# Patient Record
Sex: FEMALE | Race: BLACK OR AFRICAN AMERICAN | NOT HISPANIC OR LATINO | Employment: UNEMPLOYED | URBAN - METROPOLITAN AREA
[De-identification: names, ages, dates, MRNs, and addresses within clinical notes are randomized per-mention and may not be internally consistent; named-entity substitution may affect disease eponyms.]

---

## 2017-04-04 ENCOUNTER — HOSPITAL ENCOUNTER (EMERGENCY)
Facility: HOSPITAL | Age: 32
Discharge: HOME | End: 2017-04-04
Attending: EMERGENCY MEDICINE
Payer: MEDICAID

## 2017-04-04 ENCOUNTER — HOSPITAL ENCOUNTER (OUTPATIENT)
Dept: RADIOLOGY | Facility: HOSPITAL | Age: 32
Discharge: HOME | End: 2017-04-04
Attending: EMERGENCY MEDICINE
Payer: MEDICAID

## 2017-04-04 VITALS
HEIGHT: 67 IN | TEMPERATURE: 99 F | RESPIRATION RATE: 20 BRPM | OXYGEN SATURATION: 100 % | BODY MASS INDEX: 37.67 KG/M2 | HEART RATE: 75 BPM | DIASTOLIC BLOOD PRESSURE: 86 MMHG | SYSTOLIC BLOOD PRESSURE: 171 MMHG | WEIGHT: 240 LBS

## 2017-04-04 DIAGNOSIS — N63.0 BREAST MASS: Primary | ICD-10-CM

## 2017-04-04 DIAGNOSIS — N63.0 BREAST MASS: ICD-10-CM

## 2017-04-04 PROCEDURE — 99283 EMERGENCY DEPT VISIT LOW MDM: CPT

## 2017-04-04 NOTE — ED NOTES
Assisted MD with breast exam. Hardened/raised areas noted to bilateral breast. Order for mammogram given per MD

## 2017-04-04 NOTE — ED PROVIDER NOTES
"Encounter Date: 2017       History     Chief Complaint   Patient presents with    Breast Mass     "i went to my primary about 3 weeks ago and he told me to follow up but i didnt yet and the mass is back and i need to get it checked out"      Review of patient's allergies indicates:  No Known Allergies  Patient is a 31 y.o. female presenting with the following complaint: general illness. The history is provided by the patient.   General Illness    The current episode started several weeks ago. The problem occurs continuously. The problem has been unchanged. Nothing relieves the symptoms. Nothing aggravates the symptoms. Pertinent negatives include no fever, no eye itching, no photophobia, no abdominal pain, no constipation, no diarrhea, no nausea, no vomiting, no congestion, no ear pain, no headaches, no rhinorrhea, no sore throat, no swollen glands, no muscle aches, no neck pain, no neck stiffness, no cough, no shortness of breath, no URI, no wheezing, no rash, no diaper rash, no discharge and no pain. She has received no recent medical care.     History reviewed. No pertinent past medical history.  Past Surgical History:   Procedure Laterality Date     SECTION      TUBAL LIGATION       History reviewed. No pertinent family history.  Social History   Substance Use Topics    Smoking status: Never Smoker    Smokeless tobacco: None    Alcohol use No     Review of Systems   Constitutional: Negative for chills, diaphoresis and fever.   HENT: Negative.  Negative for congestion, drooling, ear pain, rhinorrhea and sore throat.    Eyes: Negative.  Negative for photophobia, pain, discharge and itching.   Respiratory: Negative.  Negative for cough, shortness of breath and wheezing.    Cardiovascular: Negative for chest pain, palpitations and leg swelling.   Gastrointestinal: Negative for abdominal pain, constipation, diarrhea, nausea and vomiting.   Genitourinary: Negative for difficulty urinating and " dysuria.   Musculoskeletal: Negative for arthralgias, back pain, gait problem, neck pain and neck stiffness.   Skin: Negative for color change, pallor and rash.   Neurological: Negative for dizziness, seizures, weakness, numbness and headaches.   Psychiatric/Behavioral: Negative for confusion and decreased concentration.   All other systems reviewed and are negative.      Physical Exam   Initial Vitals   BP Pulse Resp Temp SpO2   04/04/17 1340 04/04/17 1340 04/04/17 1340 04/04/17 1340 04/04/17 1340   171/86 75 20 98.8 °F (37.1 °C) 100 %     Physical Exam    Constitutional: She appears well-developed and well-nourished. No distress.   HENT:   Head: Normocephalic and atraumatic.   Eyes: Conjunctivae are normal. Pupils are equal, round, and reactive to light.   Neck: Normal range of motion. Neck supple.   Cardiovascular: Normal rate, regular rhythm and normal heart sounds.   Pulmonary/Chest: Breath sounds normal.       Abdominal: Soft. Bowel sounds are normal. She exhibits no distension. There is no tenderness. There is no rebound.   Musculoskeletal: Normal range of motion. She exhibits no edema.   Neurological: She is alert and oriented to person, place, and time. She has normal strength.   Skin: Skin is warm and dry.   Psychiatric: She has a normal mood and affect.         ED Course   Procedures  Labs Reviewed - No data to display     Patient given outpatient order for mammogram, and instructions to follow up with PCP.                       ED Course     Clinical Impression:       ICD-10-CM ICD-9-CM   1. Breast mass N63 611.72         Disposition:   Disposition: Discharged  Condition: Stable       Ezra Austin MD  04/04/17 1716

## 2017-04-04 NOTE — ED NOTES
Pt stable, in NAD, and states no further needs at this time. MD aware of vitals ok to d/c.Pt to be d/c'd home.

## 2017-04-04 NOTE — DISCHARGE INSTRUCTIONS
Breast Lump, Uncertain Cause    A lump was found in your breast. Most breast lumps are not cancer. They may be caused by normal changes in the breast tissue due to hormone variations that occur with your menstrual cycle. Some women may form lumps that are painful and tender. Others may form lumps that are painless.  At this time, is not possible to be certain of the cause of your lump without further evaluation. This could include:  · Another exam by your healthcare provider or a gynecologist  · Imaging tests, such as a mammogram or ultrasound  · Biopsy (procedure to remove small tissue samples from the breast lump)  Your healthcare provider will explain any additional testing that is needed. Be sure to get answers to any questions you may have.  Home care  Until a diagnosis is made, you may be advised to do the following:  · If you are having breast pain:  ¨ Take an over-the-counter pain reliever, if directed to by your provider.  ¨ Wear a well-fitted bra or sports bra for extra support. If you have breast pain at night, try wearing the bra during sleep.  ¨ Apply a warm compress (towel soaked in warm water) to the breast. You may also use a hot water bottle.  · Check your breasts each day. Keep a log of whether the lump seems to be changing in size or tenderness with your period. This can help your healthcare provider make the correct diagnosis.  Follow-up care  Follow up with your healthcare provider as directed. Keep all appointments. Also, prepare for any upcoming tests as directed.  When to seek medical advice  Call your healthcare provider right away if any of these occur:  · Fever of 100.4°F (38°C) or higher  · Redness or swelling of the breast  · Discharge from the nipple  · Visible changes in the skin over the nipple or breast  · Lump grows larger, feels very hard, or has an irregular shape  · New lumps form  Date Last Reviewed: 4/26/2015  © 0519-1255 [employer]. [address] [place] [address],  [place] [address]. All rights reserved. This information is not intended as a substitute for professional medical care. Always follow your healthcare professional's instructions.

## 2017-04-04 NOTE — ED AVS SNAPSHOT
"          [facility]  [address]  [facility] [address]               Ashley Powers   2017  1:41 PM   ED    Description:  Female : 1985   Department:  [facility]           Your Care was Coordinated By:     Provider Role From To    Ezra Austin MD Attending Provider 17 1341 --      Reason for Visit     Breast Mass           Diagnoses this Visit        Comments    Breast mass    -  Primary       ED Disposition     ED Disposition Condition Comment    Discharge             To Do List           Follow-up Information     Follow up with [facility].    Contact information:    [address] [place] [address] [address]  [place] [address]  [phone]        [facility]     [facility] -  Assistance  Unless otherwise directed by your provider, please contact [facility], our nurse care line that is available for  assistance.     Registered nurses in the [facility] provide: appointment scheduling, clinical advisement, health education, and other advisory services.  Call: [phone] (toll free)               Medications           Message regarding Medications     Verify the changes and/or additions to your medication regime listed below are the same as discussed with your clinician today.  If any of these changes or additions are incorrect, please notify your healthcare provider.             Verify that the below list of medications is an accurate representation of the medications you are currently taking.  If none reported, the list may be blank. If incorrect, please contact your healthcare provider. Carry this list with you in case of emergency.                Clinical Reference Information           Your Vitals Were     BP Pulse Temp Resp Height Weight    171/86 (BP Location: Left arm, Patient Position: Sitting) 75 98.8 °F (37.1 °C) (Oral) 20 5' 7" (1.702 m) 108.9 kg (240 lb)    Last Period SpO2 BMI          " 03/17/2017 (Approximate) 100% 37.59 kg/m2        Allergies as of 4/4/2017     No Known Allergies      Immunizations Administered on Date of Encounter - 4/4/2017     None      ED Micro, Lab, POCT     None      ED Imaging Orders     None        Discharge Instructions         Breast Lump, Uncertain Cause    A lump was found in your breast. Most breast lumps are not cancer. They may be caused by normal changes in the breast tissue due to hormone variations that occur with your menstrual cycle. Some women may form lumps that are painful and tender. Others may form lumps that are painless.  At this time, is not possible to be certain of the cause of your lump without further evaluation. This could include:  · Another exam by your healthcare provider or a gynecologist  · Imaging tests, such as a mammogram or ultrasound  · Biopsy (procedure to remove small tissue samples from the breast lump)  Your healthcare provider will explain any additional testing that is needed. Be sure to get answers to any questions you may have.  Home care  Until a diagnosis is made, you may be advised to do the following:  · If you are having breast pain:  ¨ Take an over-the-counter pain reliever, if directed to by your provider.  ¨ Wear a well-fitted bra or sports bra for extra support. If you have breast pain at night, try wearing the bra during sleep.  ¨ Apply a warm compress (towel soaked in warm water) to the breast. You may also use a hot water bottle.  · Check your breasts each day. Keep a log of whether the lump seems to be changing in size or tenderness with your period. This can help your healthcare provider make the correct diagnosis.  Follow-up care  Follow up with your healthcare provider as directed. Keep all appointments. Also, prepare for any upcoming tests as directed.  When to seek medical advice  Call your healthcare provider right away if any of these occur:  · Fever of 100.4°F (38°C) or higher  · Redness or swelling of the  breast  · Discharge from the nipple  · Visible changes in the skin over the nipple or breast  · Lump grows larger, feels very hard, or has an irregular shape  · New lumps form  Date Last Reviewed: 4/26/2015  © 4094-0224 [employer]. [address] [place] [address]. All rights reserved. This information is not intended as a substitute for professional medical care. Always follow your healthcare professional's instructions.          [facility] Sign-Up     Activating your [facility] account is as easy as 1-2-3!     1) Visit [email or web address], select Sign Up Now, enter this activation code and your date of birth, then select Next.  [email or web address]  Expires: 5/19/2017  1:59 PM      2) Create a username and password to use when you visit [facility] in the future and select a security question in case you lose your password and select Next.    3) Enter your e-mail address and click Sign Up!    Additional Information  If you have questions, please e-mail [email or web address] or call [phone] to talk to our [facility] staff. Remember, [facility] is NOT to be used for urgent needs. For medical emergencies, dial 911.          [facility] complies with applicable Federal civil rights laws and does not discriminate on the basis of race, color, national origin, age, disability, or sex.        Language Assistance Services     ATTENTION: Language assistance services are available, free of charge. Please call [phone].      ATENCIÓN: Si habla español, tiene a de la garza disposición servicios gratuitos de asistencia lingüística. Llame al [phone].     CHÚ Ý: N?u b?n nói Ti?ng Vi?t, có các d?ch v? h? tr? ngôn ng? mi?n phí dành cho b?n. G?i s? [phone].

## 2017-04-10 ENCOUNTER — TELEPHONE (OUTPATIENT)
Dept: RADIOLOGY | Facility: HOSPITAL | Age: 32
End: 2017-04-10

## 2017-06-07 ENCOUNTER — HOSPITAL ENCOUNTER (EMERGENCY)
Facility: HOSPITAL | Age: 32
Discharge: HOME | End: 2017-06-07
Attending: EMERGENCY MEDICINE
Payer: MEDICAID

## 2017-06-07 VITALS
RESPIRATION RATE: 20 BRPM | TEMPERATURE: 98 F | OXYGEN SATURATION: 98 % | WEIGHT: 258 LBS | DIASTOLIC BLOOD PRESSURE: 93 MMHG | HEART RATE: 81 BPM | BODY MASS INDEX: 40.41 KG/M2 | SYSTOLIC BLOOD PRESSURE: 167 MMHG

## 2017-06-07 DIAGNOSIS — K05.30 PERIODONTITIS: Primary | ICD-10-CM

## 2017-06-07 DIAGNOSIS — K02.9 DENTAL CARIES: ICD-10-CM

## 2017-06-07 PROCEDURE — 99283 EMERGENCY DEPT VISIT LOW MDM: CPT

## 2017-06-07 RX ORDER — PENICILLIN V POTASSIUM 500 MG/1
500 TABLET, FILM COATED ORAL 4 TIMES DAILY
Qty: 40 TABLET | Refills: 0 | Status: SHIPPED | OUTPATIENT
Start: 2017-06-07 | End: 2017-06-14

## 2017-06-07 RX ORDER — TRAMADOL HYDROCHLORIDE 50 MG/1
50 TABLET ORAL EVERY 6 HOURS PRN
Qty: 12 TABLET | Refills: 0 | Status: SHIPPED | OUTPATIENT
Start: 2017-06-07 | End: 2017-06-17

## 2017-06-07 NOTE — DISCHARGE INSTRUCTIONS
Advised patient to make an appointment with dentist for examination and treatment of their dental pain, as well as routine cleaning and disease prevention.  For prevention, patient was encouraged to: perform oral hygiene daily; have regular professional cleaning; brush teeth at least twice a day; floss daily; avoid constant sipping of sugary drinks or frequent sucking on candy and mints; and use toothpaste containing fluoride. Encouraged patient to gargle with warm salt water several times a day, continue to floss after eating, and complete full course of antibiotics.

## 2017-06-07 NOTE — ED PROVIDER NOTES
Encounter Date: 2017       History     Chief Complaint   Patient presents with    Dental Pain     x 1 day; took 800 mg ibuprofen with minimal relief; denies fever     Review of patient's allergies indicates:  No Known Allergies  The history is provided by the patient.   Dental Pain   The primary symptoms include mouth pain. Primary symptoms do not include dental injury, oral bleeding, oral lesions, headaches, fever, shortness of breath, sore throat or angioedema. The symptoms began several days ago. The symptoms are waxing and waning. The symptoms are recurrent. The symptoms occur constantly.   Mouth pain began 3 - 5 days ago. Mouth pain occurs constantly. Mouth pain is worsening. Affected locations include: teeth and gum(s). Pain scale at highest for mouth pain: mild. Pain scale currently for mouth pain: mild.   Additional symptoms include: dental sensitivity to temperature, gum swelling and gum tenderness. Additional symptoms do not include: purulent gums, trismus, jaw pain, facial swelling, trouble swallowing, pain with swallowing, excessive salivation, dry mouth, taste disturbance, smell disturbance, drooling, ear pain, hearing loss, nosebleeds, swollen glands, goiter and fatigue. Medical issues do not include: alcohol problem, smoking, chewing tobacco, immunosuppression, periodontal disease and cancer.     No past medical history on file.  Past Surgical History:   Procedure Laterality Date     SECTION      TUBAL LIGATION       No family history on file.  Social History   Substance Use Topics    Smoking status: Never Smoker    Smokeless tobacco: Not on file    Alcohol use No     Review of Systems   Constitutional: Negative for fatigue and fever.   HENT: Negative for drooling, ear pain, facial swelling, hearing loss, nosebleeds, sore throat and trouble swallowing.    Respiratory: Negative for shortness of breath.    Cardiovascular: Negative for chest pain.   Gastrointestinal: Negative for  nausea.   Genitourinary: Negative for dysuria.   Musculoskeletal: Negative for back pain.   Skin: Negative for rash.   Neurological: Negative for weakness and headaches.   Hematological: Does not bruise/bleed easily.   All other systems reviewed and are negative.      Physical Exam     Initial Vitals [06/07/17 0054]   BP Pulse Resp Temp SpO2   (!) 167/93 81 20 98.3 °F (36.8 °C) 98 %     Physical Exam    Nursing note and vitals reviewed.  Constitutional: She appears well-developed and well-nourished.   HENT:   Head: Normocephalic and atraumatic.   Right Ear: Hearing, tympanic membrane, external ear and ear canal normal.   Left Ear: Hearing, tympanic membrane, external ear and ear canal normal.   Nose: Nose normal.   Mouth/Throat: Oropharynx is clear and moist and mucous membranes are normal. She does not have dentures. No oral lesions. No trismus in the jaw. Abnormal dentition. Dental caries present. No dental abscesses, uvula swelling or lacerations. No oropharyngeal exudate.       No fluctuance noted.   Eyes: Conjunctivae and EOM are normal. Pupils are equal, round, and reactive to light.   Neck: Normal range of motion. Neck supple. No thyromegaly present.   Cardiovascular: Normal rate, regular rhythm, normal heart sounds and intact distal pulses. Exam reveals no gallop and no friction rub.    No murmur heard.  Pulmonary/Chest: Breath sounds normal. No respiratory distress. She has no wheezes. She has no rhonchi. She exhibits no tenderness.   Abdominal: Soft. Bowel sounds are normal. She exhibits no distension. There is no tenderness. There is no rebound and no guarding.   Musculoskeletal: Normal range of motion. She exhibits no edema or tenderness.   Lymphadenopathy:     She has no cervical adenopathy.   Neurological: She is alert and oriented to person, place, and time. She has normal strength. No cranial nerve deficit or sensory deficit.   Skin: Skin is warm and dry. No rash noted.   Psychiatric: She has a  normal mood and affect. Her behavior is normal. Judgment and thought content normal.         ED Course   Procedures  Labs Reviewed - No data to display       Vitals:    06/07/17 0053 06/07/17 0054   BP:  (!) 167/93   Pulse:  81   Resp:  20   Temp:  98.3 °F (36.8 °C)   TempSrc:  Oral   SpO2:  98%   Weight: 117 kg (258 lb)      Imaging Results    None         Medications - No data to display    1:20 AM - Re-evaluation: The patient is resting comfortably and is in no acute distress. She states that her symptoms have improved after treatment within ER. Discussed test results, shared treatment plan, specific conditions for return, and importance of follow up with patient and family.  She understands and agrees with the plan as discussed. Answered  her questions at this time. She has remained hemodynamically stable throughout the ED course and is appropriate for discharge home.     Driving or other activities under influence of medications - Patient and/or family/caretaker was given a prescription for, or instructed to use a medicine that may impair ability to drive, operate machinery, or participate in other potentially dangerous activities.  Patient was instructed not to participate in these activities while under the influence of these medications.    Advised patient to make an appointment with dentist for examination and treatment of their dental pain, as well as routine cleaning and disease prevention.  For prevention, patient was encouraged to: perform oral hygiene daily; have regular professional cleaning; brush teeth at least twice a day; floss daily; avoid constant sipping of sugary drinks or frequent sucking on candy and mints; and use toothpaste containing fluoride. Encouraged patient to gargle with warm salt water several times a day, continue to floss after eating, and complete full course of antibiotics.    Pre-hypertension/Hypertension: The pt has been informed that they may have pre-hypertension or  hypertension based on a blood pressure reading in the ED. I recommend that the pt call the PCP listed on their discharge instructions or a physician of their choice this week to arrange f/u for further evaluation of possible pre-hypertension or hypertension.     Ashley Powers was given a handout which discussed their disease process, precautions, and instructions for follow-up and therapy.    Follow-up Information     [facility]. Schedule an appointment as soon as possible for a visit in 1 week.    Contact information:  [address] [place] [address] [address] [place] [address] [phone]             [facility]. Schedule an appointment as soon as possible for a visit in 1 week.    Contact information:  [address] [facility] [address] [phone]                   Discharge Medication List as of 6/7/2017  1:20 AM      START taking these medications    Details   penicillin v potassium (VEETID) 500 MG tablet Take 1 tablet (500 mg total) by mouth 4 (four) times daily., Starting Wed 6/7/2017, Until Wed 6/14/2017, Print      tramadol (ULTRAM) 50 mg tablet Take 1 tablet (50 mg total) by mouth every 6 (six) hours as needed for Pain., Starting Wed 6/7/2017, Until Sat 6/17/2017, Print                ED Diagnosis  1. Periodontitis    2. Dental caries                             ED Course     Clinical Impression:   The primary encounter diagnosis was Periodontitis. A diagnosis of Dental caries was also pertinent to this visit.    Disposition:   Disposition: Discharged  Condition: Stable       Marco Erazo Jr., MD  06/07/17 0229

## 2018-12-06 ENCOUNTER — HOSPITAL ENCOUNTER (EMERGENCY)
Facility: HOSPITAL | Age: 33
Discharge: HOME | End: 2018-12-06
Attending: EMERGENCY MEDICINE
Payer: MEDICAID

## 2018-12-06 VITALS
RESPIRATION RATE: 20 BRPM | TEMPERATURE: 99 F | DIASTOLIC BLOOD PRESSURE: 73 MMHG | HEIGHT: 67 IN | HEART RATE: 76 BPM | BODY MASS INDEX: 37.54 KG/M2 | SYSTOLIC BLOOD PRESSURE: 142 MMHG | WEIGHT: 239.19 LBS | OXYGEN SATURATION: 99 %

## 2018-12-06 DIAGNOSIS — M79.672 FOOT PAIN, LEFT: ICD-10-CM

## 2018-12-06 DIAGNOSIS — L03.116 CELLULITIS OF LEFT FOOT: Primary | ICD-10-CM

## 2018-12-06 PROCEDURE — 99284 EMERGENCY DEPT VISIT MOD MDM: CPT

## 2018-12-06 RX ORDER — CLINDAMYCIN HYDROCHLORIDE 150 MG/1
300 CAPSULE ORAL 4 TIMES DAILY
Qty: 56 CAPSULE | Refills: 0 | Status: SHIPPED | OUTPATIENT
Start: 2018-12-06 | End: 2018-12-13

## 2018-12-06 RX ORDER — HYDROCODONE BITARTRATE AND ACETAMINOPHEN 5; 325 MG/1; MG/1
1 TABLET ORAL EVERY 4 HOURS PRN
Qty: 10 TABLET | Refills: 0 | Status: SHIPPED | OUTPATIENT
Start: 2018-12-06 | End: 2019-05-10

## 2018-12-06 NOTE — ED PROVIDER NOTES
Encounter Date: 2018       History     Chief Complaint   Patient presents with    Toe Pain     left 4th toe since tuesday. denies trauma     The history is provided by the patient.   Toe Pain   This is a new problem. The current episode started 2 days ago. The problem occurs constantly. The problem has not changed since onset.Pertinent negatives include no chest pain, no abdominal pain, no headaches and no shortness of breath. The symptoms are aggravated by bending and walking. The symptoms are relieved by rest. She has tried rest for the symptoms. The treatment provided mild relief.     Review of patient's allergies indicates:  No Known Allergies  History reviewed. No pertinent past medical history.  Past Surgical History:   Procedure Laterality Date     SECTION      TUBAL LIGATION       History reviewed. No pertinent family history.  Social History     Tobacco Use    Smoking status: Never Smoker    Smokeless tobacco: Never Used   Substance Use Topics    Alcohol use: No    Drug use: No     Review of Systems   Respiratory: Negative for shortness of breath.    Cardiovascular: Negative for chest pain.   Gastrointestinal: Negative for abdominal pain.   Musculoskeletal:        Left foot pain   Neurological: Negative for headaches.   All other systems reviewed and are negative.      Physical Exam     Initial Vitals [18 1105]   BP Pulse Resp Temp SpO2   (!) 142/73 76 20 98.6 °F (37 °C) 99 %      MAP       --         Physical Exam    Nursing note and vitals reviewed.  Constitutional: She appears well-developed and well-nourished. No distress.   HENT:   Head: Normocephalic and atraumatic.   Mouth/Throat: Oropharynx is clear and moist.   Eyes: Conjunctivae and EOM are normal. Pupils are equal, round, and reactive to light.   Neck: Normal range of motion. Neck supple.   Cardiovascular: Normal rate and regular rhythm.   Pulmonary/Chest: Breath sounds normal.   Abdominal: Soft. Bowel sounds are normal.    Musculoskeletal: Normal range of motion.   Mild erythema dorsum left foot   Neurological: She is alert and oriented to person, place, and time. She has normal strength.   Skin: Skin is warm and dry. There is erythema.   Psychiatric: She has a normal mood and affect. Thought content normal.         ED Course   Procedures  Labs Reviewed - No data to display       Imaging Results          X-Ray Foot Complete Left (Final result)  Result time 12/06/18 11:43:05    Final result by JERO Winston Sr., MD (12/06/18 11:43:05)                 Impression:      Normal study.      Electronically signed by: Rigoberto Winston MD  Date:    12/06/2018  Time:    11:43             Narrative:    EXAMINATION:  XR FOOT COMPLETE 3 VIEW LEFT    CLINICAL HISTORY:  Pain in left foot    COMPARISON:  None    FINDINGS:  There is no fracture. There is no dislocation.                            12:14 PM - Counseling: Spoke with the patient and discussed todays findings, in addition to providing specific details for the plan of care and counseling regarding the diagnosis and prognosis. Questions are answered at this time.                            Clinical Impression:   The primary encounter diagnosis was Cellulitis of left foot. A diagnosis of Foot pain, left was also pertinent to this visit.      Disposition:   Disposition: Discharged  Condition: Stable                        Binh Gonzalez MD  12/06/18 5733

## 2019-05-10 ENCOUNTER — HOSPITAL ENCOUNTER (EMERGENCY)
Facility: HOSPITAL | Age: 34
Discharge: HOME | End: 2019-05-10
Attending: EMERGENCY MEDICINE
Payer: MEDICAID

## 2019-05-10 VITALS
RESPIRATION RATE: 18 BRPM | DIASTOLIC BLOOD PRESSURE: 80 MMHG | HEIGHT: 67 IN | HEART RATE: 93 BPM | SYSTOLIC BLOOD PRESSURE: 111 MMHG | TEMPERATURE: 98 F | BODY MASS INDEX: 34.57 KG/M2 | WEIGHT: 220.25 LBS | OXYGEN SATURATION: 97 %

## 2019-05-10 DIAGNOSIS — R73.9 HYPERGLYCEMIA: ICD-10-CM

## 2019-05-10 DIAGNOSIS — N61.1 ABSCESS OF BREAST: Primary | ICD-10-CM

## 2019-05-10 LAB — POCT GLUCOSE: 164 MG/DL (ref 70–110)

## 2019-05-10 PROCEDURE — 10060 I&D ABSCESS SIMPLE/SINGLE: CPT | Mod: ER

## 2019-05-10 PROCEDURE — 25000003 PHARM REV CODE 250: Mod: ER | Performed by: PHYSICIAN ASSISTANT

## 2019-05-10 PROCEDURE — 99284 EMERGENCY DEPT VISIT MOD MDM: CPT | Mod: 25,ER

## 2019-05-10 PROCEDURE — 82962 GLUCOSE BLOOD TEST: CPT | Mod: ER

## 2019-05-10 RX ORDER — CEPHALEXIN 500 MG/1
500 CAPSULE ORAL
Status: COMPLETED | OUTPATIENT
Start: 2019-05-10 | End: 2019-05-10

## 2019-05-10 RX ORDER — IBUPROFEN 200 MG
600 TABLET ORAL
Status: COMPLETED | OUTPATIENT
Start: 2019-05-10 | End: 2019-05-10

## 2019-05-10 RX ORDER — SULFAMETHOXAZOLE AND TRIMETHOPRIM 800; 160 MG/1; MG/1
2 TABLET ORAL
Status: COMPLETED | OUTPATIENT
Start: 2019-05-10 | End: 2019-05-10

## 2019-05-10 RX ORDER — SULFAMETHOXAZOLE AND TRIMETHOPRIM 800; 160 MG/1; MG/1
2 TABLET ORAL 2 TIMES DAILY
Qty: 28 TABLET | Refills: 0 | Status: SHIPPED | OUTPATIENT
Start: 2019-05-10 | End: 2019-05-17

## 2019-05-10 RX ORDER — IBUPROFEN 600 MG/1
600 TABLET ORAL EVERY 6 HOURS PRN
Qty: 12 TABLET | Refills: 0 | Status: SHIPPED | OUTPATIENT
Start: 2019-05-10 | End: 2019-09-16

## 2019-05-10 RX ORDER — CEPHALEXIN 500 MG/1
500 CAPSULE ORAL EVERY 8 HOURS
Qty: 21 CAPSULE | Refills: 0 | Status: SHIPPED | OUTPATIENT
Start: 2019-05-10 | End: 2019-05-17

## 2019-05-10 RX ADMIN — IBUPROFEN 600 MG: 200 TABLET, FILM COATED ORAL at 12:05

## 2019-05-10 RX ADMIN — CEPHALEXIN 500 MG: 500 CAPSULE ORAL at 12:05

## 2019-05-10 RX ADMIN — SULFAMETHOXAZOLE AND TRIMETHOPRIM 2 TABLET: 800; 160 TABLET ORAL at 12:05

## 2019-05-10 RX ADMIN — LIDOCAINE HYDROCHLORIDE 10 ML: 10; .005 INJECTION, SOLUTION EPIDURAL; INFILTRATION; INTRACAUDAL; PERINEURAL at 12:05

## 2019-05-10 NOTE — ED PROVIDER NOTES
History      Chief Complaint   Patient presents with    Abscess     R breast. Denies drainage, onset approx 1 week.        Review of patient's allergies indicates:  No Known Allergies     HPI   HPI    5/10/2019, 12:54 PM   History obtained from the patient      History of Present Illness: Ashley Powers is a 33 y.o. female patient who presents to the Emergency Department for painful abscess to right breast for 7 days.  Hx of same.  Had mammogram 1 year ago, after an abscess, that was normal.  Denies f/n/v.  Symptoms are constant and moderate in severity.  No further complaints or concerns at this time.           PCP: [facility]       Past Medical History:  History reviewed. No pertinent past medical history.      Past Surgical History:  Past Surgical History:   Procedure Laterality Date     SECTION      TUBAL LIGATION             Family History:  History reviewed. No pertinent family history.        Social History:  Social History     Tobacco Use    Smoking status: Current Some Day Smoker    Smokeless tobacco: Never Used   Substance and Sexual Activity    Alcohol use: Yes     Comment: occasionally     Drug use: Yes    Sexual activity: Not on file       ROS   Review of Systems   Constitutional: Negative for activity change and appetite change.   HENT: Negative for sore throat.    Respiratory: Negative for shortness of breath.    Cardiovascular: Negative for chest pain.   Gastrointestinal: Negative for nausea and vomiting.   Genitourinary: Negative for dysuria.   Musculoskeletal: Negative for back pain and neck pain.   Skin: Positive for abscess.  Negative for rash.   Neurological: Negative for weakness.   Hematological: Does not bruise/bleed easily.   All other systems reviewed and are negative.      Review of Systems    Physical Exam      Initial Vitals [05/10/19 1207]   BP Pulse Resp Temp SpO2   111/80 93 18 98.1 °F (36.7 °C) 97 %      MAP       --         Physical Exam  Vital  signs and nursing notes reviewed.  Constitutional: Patient is in NAD. Awake and alert. Well-developed and well-nourished.  Head: Atraumatic. Normocephalic.  Eyes: PERRL. EOM intact. Conjunctivae nl. No scleral icterus.  ENT: Mucous membranes are moist. Oropharynx is clear.  Neck: Supple. No JVD. No lymphadenopathy.  No meningismus  Cardiovascular: Regular rate and rhythm. No murmurs, rubs, or gallops. Distal pulses are 2+ and symmetric.  Pulmonary/Chest: No respiratory distress. Clear to auscultation bilaterally. No wheezing, rales, or rhonchi.  Abdominal: Soft. Non-distended. No TTP. No rebound, guarding, or rigidity. Good bowel sounds.  Genitourinary: No CVA tenderness  Musculoskeletal: Moves all extremities. No edema.   Skin: Warm and dry.   6 cm of erythema with central fluctuance to right breast at 6 oclock.  Neurological: Awake and alert. No acute focal neurological deficits are appreciated.  Psychiatric: Normal affect. Good eye contact. Appropriate in content.      ED Course      I & D - Incision and Drainage  Date/Time: 5/10/2019 12:55 PM  Performed by: Gaby Roach PA-C  Authorized by: Gaby Roach PA-C   Consent Done: Yes  Consent: Verbal consent obtained.  Risks and benefits: risks, benefits and alternatives were discussed  Consent given by: patient  Patient understanding: patient states understanding of the procedure being performed  Patient consent: the patient's understanding of the procedure matches consent given  Procedure consent: procedure consent matches procedure scheduled  Type: abscess  Anesthesia: local infiltration    Anesthesia:  Local Anesthetic: lidocaine 1% with epinephrine  Scalpel size: 11  Incision type: single straight  Complexity: simple  Drainage: pus  Drainage amount: copious  Wound treatment: incision,  deloculation,  wound packed,  wound left open and  expression of material  Packing material: 1/4 in gauze  Complications: No  Patient tolerance: Patient tolerated the procedure  "well with no immediate complications        ED Vital Signs:  Vitals:    05/10/19 1207   BP: 111/80   Pulse: 93   Resp: 18   Temp: 98.1 °F (36.7 °C)   TempSrc: Oral   SpO2: 97%   Weight: 99.9 kg (220 lb 3.8 oz)   Height: 5' 7" (1.702 m)               Imaging Results:  Imaging Results    None          Discussed hyperglycemia.  Pt to fu with pcp in next few days, reduce sugar intake.  All findings were reviewed with the patient/family in detail.   All remaining questions and concerns were addressed at that time.  Patient/family has been counseled regarding the need for follow-up as well as the indication to return to the emergency room should new or worrisome developments occur.      ED Discussion               Medication(s) given in the ER:  Medications   lidocaine-EPINEPHrine (PF) 1%-1:200,000 injection 10 mL (10 mLs Intradermal Given 5/10/19 1221)   sulfamethoxazole-trimethoprim 800-160mg per tablet 2 tablet (2 tablets Oral Given 5/10/19 1221)   cephALEXin capsule 500 mg (500 mg Oral Given 5/10/19 1221)   ibuprofen tablet 600 mg (600 mg Oral Given 5/10/19 1221)           Follow-up Information     [facility] [place] In 2 days.    Contact information:  [address]  [place] [address] [phone]                          Medication List      START taking these medications    cephALEXin 500 MG capsule  Commonly known as:  KEFLEX  Take 1 capsule (500 mg total) by mouth every 8 (eight) hours. for 7 days     ibuprofen 600 MG tablet  Commonly known as:  ADVIL,MOTRIN  Take 1 tablet (600 mg total) by mouth every 6 (six) hours as needed for Pain.     sulfamethoxazole-trimethoprim 800-160mg 800-160 mg Tab  Commonly known as:  BACTRIM DS  Take 2 tablets by mouth 2 (two) times daily. for 7 days           Where to Get Your Medications      You can get these medications from any pharmacy    Bring a paper prescription for each of these medications  · cephALEXin 500 MG capsule  · ibuprofen 600 MG " tablet  · sulfamethoxazole-trimethoprim 800-160mg 800-160 mg Tab             Medical Decision Making        MDM               Clinical Impression:        ICD-10-CM ICD-9-CM   1. Abscess of breast N61.1 611.0   2. Hyperglycemia R73.9 790.29              Disposition  Stable  Discharged     ALFONSO YuC  05/10/19 0868

## 2019-06-30 ENCOUNTER — HOSPITAL ENCOUNTER (EMERGENCY)
Facility: HOSPITAL | Age: 34
Discharge: PSYCHIATRIC OR SUBSTANCE USE FACILITY | End: 2019-06-30
Attending: EMERGENCY MEDICINE
Payer: MEDICAID

## 2019-06-30 VITALS
HEIGHT: 67 IN | DIASTOLIC BLOOD PRESSURE: 79 MMHG | WEIGHT: 220 LBS | HEART RATE: 99 BPM | BODY MASS INDEX: 34.53 KG/M2 | SYSTOLIC BLOOD PRESSURE: 136 MMHG | TEMPERATURE: 99 F | RESPIRATION RATE: 20 BRPM | OXYGEN SATURATION: 98 %

## 2019-06-30 DIAGNOSIS — R03.0 ELEVATED BLOOD PRESSURE READING: ICD-10-CM

## 2019-06-30 DIAGNOSIS — R73.9 HYPERGLYCEMIA: ICD-10-CM

## 2019-06-30 DIAGNOSIS — Z00.8 ENCOUNTER FOR PSYCHOLOGICAL EVALUATION: ICD-10-CM

## 2019-06-30 DIAGNOSIS — F29 PSYCHOSIS, UNSPECIFIED PSYCHOSIS TYPE: Primary | ICD-10-CM

## 2019-06-30 LAB
ALBUMIN SERPL BCP-MCNC: 4.2 G/DL (ref 3.5–5.2)
ALP SERPL-CCNC: 58 U/L (ref 55–135)
ALT SERPL W/O P-5'-P-CCNC: 15 U/L (ref 10–44)
AMPHET+METHAMPHET UR QL: NEGATIVE
ANION GAP SERPL CALC-SCNC: 11 MMOL/L (ref 8–16)
APAP SERPL-MCNC: <3 UG/ML (ref 10–20)
AST SERPL-CCNC: 13 U/L (ref 10–40)
B-HCG UR QL: NEGATIVE
BARBITURATES UR QL SCN>200 NG/ML: NEGATIVE
BASOPHILS # BLD AUTO: 0 K/UL (ref 0–0.2)
BASOPHILS NFR BLD: 0 % (ref 0–1.9)
BENZODIAZ UR QL SCN>200 NG/ML: NEGATIVE
BILIRUB SERPL-MCNC: 0.3 MG/DL (ref 0.1–1)
BUN SERPL-MCNC: 16 MG/DL (ref 6–20)
BZE UR QL SCN: NEGATIVE
CALCIUM SERPL-MCNC: 9.9 MG/DL (ref 8.7–10.5)
CANNABINOIDS UR QL SCN: NORMAL
CHLORIDE SERPL-SCNC: 104 MMOL/L (ref 95–110)
CK SERPL-CCNC: 168 U/L (ref 20–180)
CO2 SERPL-SCNC: 23 MMOL/L (ref 23–29)
CREAT SERPL-MCNC: 1 MG/DL (ref 0.5–1.4)
CREAT UR-MCNC: 198.8 MG/DL (ref 15–325)
DIFFERENTIAL METHOD: ABNORMAL
EOSINOPHIL # BLD AUTO: 0 K/UL (ref 0–0.5)
EOSINOPHIL NFR BLD: 0.2 % (ref 0–8)
ERYTHROCYTE [DISTWIDTH] IN BLOOD BY AUTOMATED COUNT: 15.6 % (ref 11.5–14.5)
EST. GFR  (AFRICAN AMERICAN): >60 ML/MIN/1.73 M^2
EST. GFR  (NON AFRICAN AMERICAN): >60 ML/MIN/1.73 M^2
ETHANOL SERPL-MCNC: <10 MG/DL
GLUCOSE SERPL-MCNC: 219 MG/DL (ref 70–110)
HCT VFR BLD AUTO: 37.3 % (ref 37–48.5)
HGB BLD-MCNC: 11.5 G/DL (ref 12–16)
LYMPHOCYTES # BLD AUTO: 1.4 K/UL (ref 1–4.8)
LYMPHOCYTES NFR BLD: 12.5 % (ref 18–48)
MCH RBC QN AUTO: 23.8 PG (ref 27–31)
MCHC RBC AUTO-ENTMCNC: 30.8 G/DL (ref 32–36)
MCV RBC AUTO: 77 FL (ref 82–98)
METHADONE UR QL SCN>300 NG/ML: NEGATIVE
MONOCYTES # BLD AUTO: 0.7 K/UL (ref 0.3–1)
MONOCYTES NFR BLD: 5.9 % (ref 4–15)
NEUTROPHILS # BLD AUTO: 9.1 K/UL (ref 1.8–7.7)
NEUTROPHILS NFR BLD: 81.4 % (ref 38–73)
OPIATES UR QL SCN: NORMAL
PCP UR QL SCN>25 NG/ML: NEGATIVE
PLATELET # BLD AUTO: 288 K/UL (ref 150–350)
PMV BLD AUTO: 10.2 FL (ref 9.2–12.9)
POCT GLUCOSE: 114 MG/DL (ref 70–110)
POTASSIUM SERPL-SCNC: 3.9 MMOL/L (ref 3.5–5.1)
PROT SERPL-MCNC: 8.4 G/DL (ref 6–8.4)
RBC # BLD AUTO: 4.84 M/UL (ref 4–5.4)
SALICYLATES SERPL-MCNC: <5 MG/DL (ref 15–30)
SODIUM SERPL-SCNC: 138 MMOL/L (ref 136–145)
TOXICOLOGY INFORMATION: NORMAL
TSH SERPL DL<=0.005 MIU/L-ACNC: 0.56 UIU/ML (ref 0.4–4)
WBC # BLD AUTO: 11.14 K/UL (ref 3.9–12.7)

## 2019-06-30 PROCEDURE — 80307 DRUG TEST PRSMV CHEM ANLYZR: CPT | Mod: ER

## 2019-06-30 PROCEDURE — 85025 COMPLETE CBC W/AUTO DIFF WBC: CPT | Mod: ER

## 2019-06-30 PROCEDURE — 96372 THER/PROPH/DIAG INJ SC/IM: CPT | Mod: ER,59

## 2019-06-30 PROCEDURE — 93010 ELECTROCARDIOGRAM REPORT: CPT | Mod: ,,, | Performed by: NUCLEAR MEDICINE

## 2019-06-30 PROCEDURE — 99284 EMERGENCY DEPT VISIT MOD MDM: CPT | Mod: 25,ER

## 2019-06-30 PROCEDURE — 80329 ANALGESICS NON-OPIOID 1 OR 2: CPT | Mod: ER

## 2019-06-30 PROCEDURE — 80320 DRUG SCREEN QUANTALCOHOLS: CPT | Mod: ER

## 2019-06-30 PROCEDURE — 96374 THER/PROPH/DIAG INJ IV PUSH: CPT | Mod: ER

## 2019-06-30 PROCEDURE — 81025 URINE PREGNANCY TEST: CPT | Mod: ER

## 2019-06-30 PROCEDURE — 99900035 HC TECH TIME PER 15 MIN (STAT): Mod: ER

## 2019-06-30 PROCEDURE — 25000003 PHARM REV CODE 250: Mod: ER | Performed by: EMERGENCY MEDICINE

## 2019-06-30 PROCEDURE — 93005 ELECTROCARDIOGRAM TRACING: CPT | Mod: ER

## 2019-06-30 PROCEDURE — 93010 EKG 12-LEAD: ICD-10-PCS | Mod: ,,, | Performed by: NUCLEAR MEDICINE

## 2019-06-30 PROCEDURE — 96361 HYDRATE IV INFUSION ADD-ON: CPT | Mod: ER

## 2019-06-30 PROCEDURE — 82550 ASSAY OF CK (CPK): CPT | Mod: ER

## 2019-06-30 PROCEDURE — 63600175 PHARM REV CODE 636 W HCPCS: Mod: ER | Performed by: EMERGENCY MEDICINE

## 2019-06-30 PROCEDURE — 80053 COMPREHEN METABOLIC PANEL: CPT | Mod: ER

## 2019-06-30 PROCEDURE — 84443 ASSAY THYROID STIM HORMONE: CPT | Mod: ER

## 2019-06-30 RX ORDER — GLUCAGON 1 MG
1 KIT INJECTION
Status: DISCONTINUED | OUTPATIENT
Start: 2019-06-30 | End: 2019-06-30 | Stop reason: HOSPADM

## 2019-06-30 RX ORDER — IBUPROFEN 200 MG
16 TABLET ORAL
Status: DISCONTINUED | OUTPATIENT
Start: 2019-06-30 | End: 2019-06-30 | Stop reason: HOSPADM

## 2019-06-30 RX ORDER — INSULIN ASPART 100 [IU]/ML
1-10 INJECTION, SOLUTION INTRAVENOUS; SUBCUTANEOUS
Status: DISCONTINUED | OUTPATIENT
Start: 2019-06-30 | End: 2019-06-30 | Stop reason: HOSPADM

## 2019-06-30 RX ORDER — IBUPROFEN 200 MG
24 TABLET ORAL
Status: DISCONTINUED | OUTPATIENT
Start: 2019-06-30 | End: 2019-06-30 | Stop reason: HOSPADM

## 2019-06-30 RX ORDER — LORAZEPAM 1 MG/1
1 TABLET ORAL EVERY 4 HOURS PRN
Status: DISCONTINUED | OUTPATIENT
Start: 2019-06-30 | End: 2019-06-30 | Stop reason: HOSPADM

## 2019-06-30 RX ADMIN — SODIUM CHLORIDE 1000 ML: 0.9 INJECTION, SOLUTION INTRAVENOUS at 07:06

## 2019-06-30 RX ADMIN — LORAZEPAM 1 MG: 2 INJECTION INTRAMUSCULAR; INTRAVENOUS at 06:06

## 2019-06-30 RX ADMIN — LORAZEPAM 1 MG: 1 TABLET ORAL at 05:06

## 2019-06-30 RX ADMIN — LORAZEPAM 1 MG: 2 INJECTION INTRAMUSCULAR; INTRAVENOUS at 07:06

## 2019-06-30 NOTE — ED NOTES
Pt's father Dylan Powers came to ED to visit pt. Informed him that pt was sleeping, and he states that he will return later. He requested that I call him when pt receives placement. [phone].

## 2019-06-30 NOTE — ED NOTES
After removing the patient's belongings the patient was restless and began to pace in the room. Sent her sister to the bedside to help calm her down.

## 2019-06-30 NOTE — ED NOTES
Admit packet faxed to [facility] for placement at this time. Will notify of status change in acceptance when received thereafter.

## 2019-06-30 NOTE — ED NOTES
Pt escorted to [facility] vehicle with two [facility] personnel and security.

## 2019-06-30 NOTE — ED NOTES
Pt is now calm and cooperative. Placed on cardiac monitor. Sinus tach on monitor, resp e/u, skin warm and dry, nad. Masha, RT is at the bedside for direct observation. Will continue to monitor.

## 2019-06-30 NOTE — ED NOTES
Pt ambulatory to and from restroom to obtain urine specimen. Steady gait noted. Pt cooperative at this time. Shyann Pruitt, HERMES is at the bedside for direct observation. Will continue to monitor.

## 2019-06-30 NOTE — ED NOTES
Patient accepted by [facility] at [facility] ([address], [place]) for Dr. Soni   Report to be called to [phone], ext [phone]/bldg [phone]

## 2019-06-30 NOTE — ED NOTES
Pt sleeping, normal sinus rhythm on cardiac monitor, resp e/u, skin warm and dry,nad. PEC safety protocols remain in place except for cardiac monitor per md order. Masha, RT is at the bedside for direct observation. Will continue to monitor.

## 2019-06-30 NOTE — ED NOTES
Pt awake, calm and cooperative, resp e/u, nad. PEC safety protocols in remain in place except for cardiac monitor per md order.  Masha, RT is at the bedside for direct observation. Will continue to monitor.

## 2019-06-30 NOTE — ED PROVIDER NOTES
Encounter Date: 2019       History     Chief Complaint   Patient presents with    Altered Mental Status     patient came in via [facility] because the patient was wandering at a gas station for an hour, eating ice out of the ice machine. Police were called.      Patient currently presents via EMS with concern regarding behavioral disturbances.  Patient was found at a nearby gas station early this AM repeatedly grabbing ice, walking outside eating some of it, then throwing the rest into the air before raising her hands and bowing in prayer.  Per witnesses, the patient appears to be responding to internal stimuli.  Patient's sister is currently available in the ED.  She reports that the patient has a history of bizarre behavior and was started on medications for a primary care visit earlier this year but then later elected to take her of self off those medications.  She notes that the patient has not sought treatment since that time.  Patient unfortunately offers us little history repeatedly saying that she just wants to go home and sleep.  Patient notes that she was drinking late last night but denies any illicit drug abuse.  Family members report a history of illicit drug abuse but her unsure if she has been using lately.        Review of patient's allergies indicates:  No Known Allergies  Past Medical History:   Diagnosis Date    Bipolar disorder      Past Surgical History:   Procedure Laterality Date     SECTION      TUBAL LIGATION       History reviewed. No pertinent family history.  Social History     Tobacco Use    Smoking status: Current Some Day Smoker    Smokeless tobacco: Never Used   Substance Use Topics    Alcohol use: Yes     Comment: occasionally     Drug use: Yes     Review of Systems   Constitutional: Negative for chills and fever.   HENT: Negative for congestion and rhinorrhea.    Respiratory: Negative for cough, chest tightness, shortness of breath and wheezing.    Cardiovascular:  Negative for chest pain, palpitations and leg swelling.   Gastrointestinal: Negative for abdominal pain, constipation, diarrhea, nausea and vomiting.   Genitourinary: Negative for dysuria, frequency, urgency, vaginal bleeding and vaginal discharge.   Skin: Negative for color change and rash.   Allergic/Immunologic: Negative for immunocompromised state.   Neurological: Negative for dizziness, weakness and numbness.   Hematological: Negative for adenopathy. Does not bruise/bleed easily.   Psychiatric/Behavioral: Positive for dysphoric mood, hallucinations and sleep disturbance. Negative for agitation, self-injury and suicidal ideas. The patient is nervous/anxious.    All other systems reviewed and are negative.      Physical Exam     Initial Vitals [06/30/19 0521]   BP Pulse Resp Temp SpO2   (!) 191/120 (!) 129 18 99.1 °F (37.3 °C) 99 %      MAP       --         Vitals:    06/30/19 0735 06/30/19 0736 06/30/19 0747 06/30/19 0802   BP: (!) 157/97  (!) 148/92 (!) 117/59   Pulse: 108 109 107 96   Resp: 19 18 18   Temp: 98.8 °F (37.1 °C)      TempSrc: Oral      SpO2: 100% 100% 100% 98%   Weight:       Height:        06/30/19 0809 06/30/19 0902 06/30/19 0916 06/30/19 0947   BP: (!) 117/59 111/60 110/60 (!) 106/59   Pulse: 94 85 84 79   Resp: 20 19 18 17   Temp:   98.7 °F (37.1 °C)    TempSrc:   Oral    SpO2: 99% 100% 100% 100%   Weight:       Height:        06/30/19 1016 06/30/19 1031 06/30/19 1045 06/30/19 1047   BP: 114/64 (!) 142/71  (!) 146/75   Pulse: 75 104  102   Resp: 16 20 19   Temp:       TempSrc:       SpO2: 100%  99% 100%   Weight:       Height:        06/30/19 1130 06/30/19 1201 06/30/19 1300   BP: 138/73 (!) 140/70 135/72   Pulse: 89 83 88   Resp: 19 20 20   Temp:   98.8 °F (37.1 °C)   TempSrc:   Oral   SpO2: 98% 100% 99%   Weight:      Height:            Physical Exam    Nursing note and vitals reviewed.  Constitutional: She appears well-developed and well-nourished. She is not diaphoretic. No distress.    HENT:   Head: Normocephalic and atraumatic.   Right Ear: External ear normal.   Left Ear: External ear normal.   Nose: Nose normal.   Mouth/Throat: Oropharynx is clear and moist.   Eyes: Conjunctivae and EOM are normal. Pupils are equal, round, and reactive to light. No scleral icterus.   Neck: Neck supple. No tracheal deviation present. No JVD present.   Cardiovascular: Regular rhythm, normal heart sounds and intact distal pulses. Tachycardia present.  Exam reveals no gallop and no friction rub.    No murmur heard.  Pulmonary/Chest: Breath sounds normal. No respiratory distress. She has no wheezes. She has no rhonchi. She has no rales.   Abdominal: Soft. Bowel sounds are normal. She exhibits no distension. There is no tenderness.   Musculoskeletal: Normal range of motion. She exhibits no edema.   Neurological: She is alert and oriented to person, place, and time. She has normal strength. No cranial nerve deficit or sensory deficit. GCS score is 15. GCS eye subscore is 4. GCS verbal subscore is 5. GCS motor subscore is 6.   Skin: Skin is warm and dry. No rash noted.   Psychiatric: Her mood appears anxious. Her speech is delayed. She is withdrawn and actively hallucinating. Thought content is paranoid. Cognition and memory are normal. She expresses impulsivity. She expresses no homicidal and no suicidal ideation.   Appears to be demonstrating hyper-Samaritan behavior and responding to internal stimuli; very flat affect She is inattentive.       ED Course   Procedures  Labs Reviewed   COMPREHENSIVE METABOLIC PANEL - Abnormal; Notable for the following components:       Result Value    Glucose 219 (*)     All other components within normal limits   CBC W/ AUTO DIFFERENTIAL - Abnormal; Notable for the following components:    Hemoglobin 11.5 (*)     Mean Corpuscular Volume 77 (*)     Mean Corpuscular Hemoglobin 23.8 (*)     Mean Corpuscular Hemoglobin Conc 30.8 (*)     RDW 15.6 (*)     Gran # (ANC) 9.1 (*)     Gran%  81.4 (*)     Lymph% 12.5 (*)     All other components within normal limits   ACETAMINOPHEN LEVEL - Abnormal; Notable for the following components:    Acetaminophen (Tylenol), Serum <3.0 (*)     All other components within normal limits   SALICYLATE LEVEL - Abnormal; Notable for the following components:    Salicylate Lvl <5.0 (*)     All other components within normal limits   POCT GLUCOSE - Abnormal; Notable for the following components:    POCT Glucose 114 (*)     All other components within normal limits   PREGNANCY TEST, URINE RAPID   ALCOHOL,MEDICAL (ETHANOL)   DRUG SCREEN PANEL, URINE EMERGENCY   TSH   CK   POCT GLUCOSE MONITORING CONTINUOUS       Results for orders placed or performed during the hospital encounter of 06/30/19   Pregnancy, urine rapid   Result Value Ref Range    Preg Test, Ur Negative    Ethanol   Result Value Ref Range    Alcohol, Medical, Serum <10 <10 mg/dL   Drug screen panel, emergency   Result Value Ref Range    Benzodiazepines Negative     Methadone metabolites Negative     Cocaine (Metab.) Negative     Opiate Scrn, Ur Presumptive Positive     Barbiturate Screen, Ur Negative     Amphetamine Screen, Ur Negative     THC Presumptive Positive     Phencyclidine Negative     Creatinine, Random Ur 198.8 15.0 - 325.0 mg/dL    Toxicology Information SEE COMMENT    Comprehensive metabolic panel   Result Value Ref Range    Sodium 138 136 - 145 mmol/L    Potassium 3.9 3.5 - 5.1 mmol/L    Chloride 104 95 - 110 mmol/L    CO2 23 23 - 29 mmol/L    Glucose 219 (H) 70 - 110 mg/dL    BUN, Bld 16 6 - 20 mg/dL    Creatinine 1.0 0.5 - 1.4 mg/dL    Calcium 9.9 8.7 - 10.5 mg/dL    Total Protein 8.4 6.0 - 8.4 g/dL    Albumin 4.2 3.5 - 5.2 g/dL    Total Bilirubin 0.3 0.1 - 1.0 mg/dL    Alkaline Phosphatase 58 55 - 135 U/L    AST 13 10 - 40 U/L    ALT 15 10 - 44 U/L    Anion Gap 11 8 - 16 mmol/L    eGFR if African American >60.0 >60 mL/min/1.73 m^2    eGFR if non African American >60.0 >60 mL/min/1.73 m^2   CBC  auto differential   Result Value Ref Range    WBC 11.14 3.90 - 12.70 K/uL    RBC 4.84 4.00 - 5.40 M/uL    Hemoglobin 11.5 (L) 12.0 - 16.0 g/dL    Hematocrit 37.3 37.0 - 48.5 %    Mean Corpuscular Volume 77 (L) 82 - 98 fL    Mean Corpuscular Hemoglobin 23.8 (L) 27.0 - 31.0 pg    Mean Corpuscular Hemoglobin Conc 30.8 (L) 32.0 - 36.0 g/dL    RDW 15.6 (H) 11.5 - 14.5 %    Platelets 288 150 - 350 K/uL    MPV 10.2 9.2 - 12.9 fL    Gran # (ANC) 9.1 (H) 1.8 - 7.7 K/uL    Lymph # 1.4 1.0 - 4.8 K/uL    Mono # 0.7 0.3 - 1.0 K/uL    Eos # 0.0 0.0 - 0.5 K/uL    Baso # 0.00 0.00 - 0.20 K/uL    Gran% 81.4 (H) 38.0 - 73.0 %    Lymph% 12.5 (L) 18.0 - 48.0 %    Mono% 5.9 4.0 - 15.0 %    Eosinophil% 0.2 0.0 - 8.0 %    Basophil% 0.0 0.0 - 1.9 %    Differential Method Automated    Acetaminophen level   Result Value Ref Range    Acetaminophen (Tylenol), Serum <3.0 (L) 10.0 - 20.0 ug/mL   Salicylate level   Result Value Ref Range    Salicylate Lvl <5.0 (L) 15.0 - 30.0 mg/dL   TSH   Result Value Ref Range    TSH 0.557 0.400 - 4.000 uIU/mL   CK   Result Value Ref Range     20 - 180 U/L   POCT glucose   Result Value Ref Range    POCT Glucose 114 (H) 70 - 110 mg/dL       EKG Readings: (Independently Interpreted)   Initial Reading: No STEMI. Rhythm: Sinus Tachycardia. Heart Rate: 116. Ectopy: No Ectopy. Conduction: Normal. Axis: Normal. Other Impression: LVH       Imaging Results    None          Medical Decision Making:   ED Management:  All historical, clinical, and laboratory findings were reviewed with the patient/family in detail along with the indications for transfer to an inpatient psychiatric services as we do not have those services available at this facility.  All remaining questions and concerns were addressed at that time and the patient/family communicates understanding and agrees to proceed accordingly.      Following discussion of all pertinent details presently available from the patient's encounter, the patient was  transitioned into the care of Dr. Levine for ongoing evaluation and management.  Mohamud Wolf MD  6:08 AM                         6:25 AM Patient is pacing around the room.   Will give Ativan to help patient relax.    7:18 AM patient attempting use bathroom.  Ambulates without assistance.  Patient at times hard to redirect.    7:10 AM  Patient under PEC. Spoke with her sister, Cate.  Discussed PEC process and plan of care.    11:54 AM patient appears to be more rested.  Blood pressure and heart rate have improved.  Currently awaiting urinalysis and alcohol.    1:06 PM Patient is medically clear for placement.    1:06 PM  Patient under PEC.     All historical, clinical, radiographic, and laboratory findings were reviewed with the patient/family in detail.  I discussed the indications and treatment need (inpatient psychiatric care) for transfer  to an outside facility secondary to no inpatient psychiatric services offered at this facility.   Patient/family verbalized understanding of the plan of care.   All remaining questions and concerns were addressed at that time and the patient/family agrees to proceed accordingly.  Patient will be transferred by [facility]  (with individuals trained in CPR and CPI) secondary to a need for ongoing personal protection en route.  Risks:    loss of vitals signs, permanent neurologic damage, MVC, resulting in death or loss of neurologic function.  Benefits of transfer: Inpatient psychiatric care.  Patient/family agree and verbalized understanding of the plan of care.     Accepting Facility: Awaiting accepting facility  Accepting Physician: Awaiting accepting physician.         Cony Levine DO      4:02 PM Patient accepted at [facility].  [facility] Intake Nurse.  Dr. Soni.    5:14 PM Patient pulled the IV out.   Transported on the way.  Patient is refusing to take Ativan by mouth.  Will administer Ativan IM.                           Clinical Impression:       ICD-10-CM ICD-9-CM    1. Psychosis, unspecified psychosis type F29 298.9   2. Encounter for psychological evaluation Z00.8 V72.85   3. Hyperglycemia R73.9 790.29   4. Elevated blood pressure reading R03.0 796.2         Disposition:   Disposition: Transferred  Reason for referral: Psychiatric facility  [facility].                       Cony Levine,   06/30/19 1603       Cony Levine, DO  06/30/19 1606       Cony Levine,   06/30/19 1719

## 2019-06-30 NOTE — ED NOTES
No change in pt status. PEC safety protocols remain in place with the exception of cardiac monitoring per md order. Shyann Pruitt, HERMES is at the bedside for direct observation. Will continue to monitor.

## 2019-06-30 NOTE — ED NOTES
Pt is sleeping, normal sinus rhythm on cardiac monitor, resp e/u, VSS, skin warm and dry, nad. Pt remains in gray hospital gown with yellow fall prevention socks. Pt's room remains clear per PEC protocol (except for cardiac monitor per md order). Masha, RT is at the bedside for direct observation. Will continue to monitor.

## 2019-06-30 NOTE — ED NOTES
"Patient presented to the ER via [facility]. The patient was reportedly walking around the gas station parking lot and inside the gas station store acting in a bizarre manner. She was reportedly eating ice out of the fountain drink machine. The police were called. The police stated that they contacted her sister who reported that she was on some medications for "strange behavior" but did not know what the medication was. The patient has recently weaned herself off of the medication. Patient reportedly was drinking 6/[birth year]2019 to celebrate her birthday.     LOC: The patient is awake, alert and aware of environment with a flat affect, the patient is oriented x 3, but with delayed responses. The patient speaks very respectfully.   APPEARANCE: Patient resting comfortably and in no acute distress, patient is disheveled.  HEENT: Brief WNL  SKIN: Brief WNL.   MUSCULOSKELETAL: Brief WNL  RESPIRATORY: Brief WNL  CARDIAC: Brief WNL  GASTRO: Brief WNL  : Brief WNL  Peripheral Vasc: Brief WNL  NEURO: Brief WNL  PSYCH: Patient is hyper Buddhist and continues to speak to herself. Denies visual and auditory hallucinations. Denies thoughts of self harm and harm to others. Patient states she "feels uncomfortable".   "

## 2019-06-30 NOTE — ED NOTES
Report received from CARLEEN Krueger. I have assumed care of the pt at this time. Pt is still refusing to let staff start an IV for blood draw and fluid administration. Pt is in gray hospital gown with yellow fall prevention socks. Pt's room is clear per protocol with the exception of cardiac monitoring per md order. Pt does not allow staff to place her on the cardiac monitor at this time. Md is aware. CARLEEN Hardwick is at the bedside for direct observation. Will continue to monitor.

## 2019-06-30 NOTE — ED NOTES
Masha, RT is at the bedside for direct observation. Pt continues to not allow staff to start IV fluids or place her on the cardiac monitor. Md mackay.

## 2019-06-30 NOTE — ED NOTES
Medical clearance signed by Md or by nurse per MD with [facility] is needed to complete placement process.

## 2019-06-30 NOTE — ED NOTES
Patient's belongings:    1 gold chain  1 hoop earring  1 pair of leggings  1 green shirt  1 black bra  1 pair of camo slide on flip flops    Labeled with patient's name and placed in her place of site by the sitter. The patient had a close attachment to the gold chain.

## 2019-09-16 ENCOUNTER — HOSPITAL ENCOUNTER (EMERGENCY)
Facility: HOSPITAL | Age: 34
Discharge: PSYCHIATRIC OR SUBSTANCE USE FACILITY | End: 2019-09-17
Attending: EMERGENCY MEDICINE
Payer: MEDICAID

## 2019-09-16 DIAGNOSIS — F30.10 MANIC BEHAVIOR: Primary | ICD-10-CM

## 2019-09-16 LAB
ALBUMIN SERPL BCP-MCNC: 4 G/DL (ref 3.5–5.2)
ALP SERPL-CCNC: 64 U/L (ref 55–135)
ALT SERPL W/O P-5'-P-CCNC: 17 U/L (ref 10–44)
AMPHET+METHAMPHET UR QL: NEGATIVE
ANION GAP SERPL CALC-SCNC: 9 MMOL/L (ref 8–16)
APAP SERPL-MCNC: <3 UG/ML (ref 10–20)
AST SERPL-CCNC: 14 U/L (ref 10–40)
B-HCG UR QL: NEGATIVE
BACTERIA #/AREA URNS AUTO: ABNORMAL /HPF
BARBITURATES UR QL SCN>200 NG/ML: NEGATIVE
BASOPHILS # BLD AUTO: 0.01 K/UL (ref 0–0.2)
BASOPHILS NFR BLD: 0.1 % (ref 0–1.9)
BENZODIAZ UR QL SCN>200 NG/ML: NEGATIVE
BILIRUB SERPL-MCNC: 0.3 MG/DL (ref 0.1–1)
BILIRUB UR QL STRIP: NEGATIVE
BUN SERPL-MCNC: 13 MG/DL (ref 6–20)
BZE UR QL SCN: NEGATIVE
CALCIUM SERPL-MCNC: 9.3 MG/DL (ref 8.7–10.5)
CANNABINOIDS UR QL SCN: NORMAL
CHLORIDE SERPL-SCNC: 106 MMOL/L (ref 95–110)
CLARITY UR REFRACT.AUTO: ABNORMAL
CO2 SERPL-SCNC: 25 MMOL/L (ref 23–29)
COLOR UR AUTO: ABNORMAL
CREAT SERPL-MCNC: 0.8 MG/DL (ref 0.5–1.4)
CREAT UR-MCNC: 98.7 MG/DL (ref 15–325)
DIFFERENTIAL METHOD: ABNORMAL
EOSINOPHIL # BLD AUTO: 0.2 K/UL (ref 0–0.5)
EOSINOPHIL NFR BLD: 2.7 % (ref 0–8)
ERYTHROCYTE [DISTWIDTH] IN BLOOD BY AUTOMATED COUNT: 15.2 % (ref 11.5–14.5)
EST. GFR  (AFRICAN AMERICAN): >60 ML/MIN/1.73 M^2
EST. GFR  (NON AFRICAN AMERICAN): >60 ML/MIN/1.73 M^2
ETHANOL SERPL-MCNC: <10 MG/DL
GLUCOSE SERPL-MCNC: 107 MG/DL (ref 70–110)
GLUCOSE UR QL STRIP: NEGATIVE
HCT VFR BLD AUTO: 36.2 % (ref 37–48.5)
HGB BLD-MCNC: 11 G/DL (ref 12–16)
HGB UR QL STRIP: ABNORMAL
HYALINE CASTS UR QL AUTO: 0 /LPF
KETONES UR QL STRIP: ABNORMAL
LEUKOCYTE ESTERASE UR QL STRIP: ABNORMAL
LYMPHOCYTES # BLD AUTO: 3.1 K/UL (ref 1–4.8)
LYMPHOCYTES NFR BLD: 41.6 % (ref 18–48)
MCH RBC QN AUTO: 23.7 PG (ref 27–31)
MCHC RBC AUTO-ENTMCNC: 30.4 G/DL (ref 32–36)
MCV RBC AUTO: 78 FL (ref 82–98)
METHADONE UR QL SCN>300 NG/ML: NEGATIVE
MICROSCOPIC COMMENT: ABNORMAL
MONOCYTES # BLD AUTO: 0.6 K/UL (ref 0.3–1)
MONOCYTES NFR BLD: 8 % (ref 4–15)
NEUTROPHILS # BLD AUTO: 3.5 K/UL (ref 1.8–7.7)
NEUTROPHILS NFR BLD: 47.6 % (ref 38–73)
NITRITE UR QL STRIP: POSITIVE
OPIATES UR QL SCN: NEGATIVE
PCP UR QL SCN>25 NG/ML: NEGATIVE
PH UR STRIP: 7 [PH] (ref 5–8)
PLATELET # BLD AUTO: 277 K/UL (ref 150–350)
PMV BLD AUTO: 9.8 FL (ref 9.2–12.9)
POTASSIUM SERPL-SCNC: 3.8 MMOL/L (ref 3.5–5.1)
PROT SERPL-MCNC: 7.7 G/DL (ref 6–8.4)
PROT UR QL STRIP: ABNORMAL
RBC # BLD AUTO: 4.64 M/UL (ref 4–5.4)
RBC #/AREA URNS AUTO: >100 /HPF (ref 0–4)
SODIUM SERPL-SCNC: 140 MMOL/L (ref 136–145)
SP GR UR STRIP: 1.01 (ref 1–1.03)
SQUAMOUS #/AREA URNS AUTO: 10 /HPF
TOXICOLOGY INFORMATION: NORMAL
TSH SERPL DL<=0.005 MIU/L-ACNC: 0.55 UIU/ML (ref 0.4–4)
URN SPEC COLLECT METH UR: ABNORMAL
UROBILINOGEN UR STRIP-ACNC: <2 EU/DL
WBC # BLD AUTO: 7.33 K/UL (ref 3.9–12.7)
WBC #/AREA URNS AUTO: 20 /HPF (ref 0–5)

## 2019-09-16 PROCEDURE — 80320 DRUG SCREEN QUANTALCOHOLS: CPT | Mod: ER

## 2019-09-16 PROCEDURE — 87086 URINE CULTURE/COLONY COUNT: CPT

## 2019-09-16 PROCEDURE — 80307 DRUG TEST PRSMV CHEM ANLYZR: CPT | Mod: ER

## 2019-09-16 PROCEDURE — 85025 COMPLETE CBC W/AUTO DIFF WBC: CPT | Mod: ER

## 2019-09-16 PROCEDURE — 80053 COMPREHEN METABOLIC PANEL: CPT | Mod: ER

## 2019-09-16 PROCEDURE — 80329 ANALGESICS NON-OPIOID 1 OR 2: CPT | Mod: ER

## 2019-09-16 PROCEDURE — 81025 URINE PREGNANCY TEST: CPT | Mod: ER

## 2019-09-16 PROCEDURE — 99285 EMERGENCY DEPT VISIT HI MDM: CPT | Mod: ER

## 2019-09-16 PROCEDURE — 81000 URINALYSIS NONAUTO W/SCOPE: CPT | Mod: 59,ER

## 2019-09-16 PROCEDURE — 84443 ASSAY THYROID STIM HORMONE: CPT | Mod: ER

## 2019-09-16 RX ORDER — ZIPRASIDONE MESYLATE 20 MG/ML
20 INJECTION, POWDER, LYOPHILIZED, FOR SOLUTION INTRAMUSCULAR
Status: DISCONTINUED | OUTPATIENT
Start: 2019-09-16 | End: 2019-09-17 | Stop reason: HOSPADM

## 2019-09-16 NOTE — ED NOTES
Pt was brought to ED by [employer] deputy . Pt was placed under an [facility]. Pt's aunt, Cyn Roberts is concerned with pt's behavior. Pt exhibits bizarre behavior. Pt aunt reports that pt is using drugs again and is a threat to herself and others.

## 2019-09-16 NOTE — ED NOTES
Pt changed into gray hospital gown with yellow fall prevention socks; however, she refuses to take black tights off. Pt reminded of the need for urine specimen.  Pt's belongings taken and placed in EMS locker number 1. Code is 1111.    Pt's contact: Dylan Gio (Pt's father): [phone]       Pt's belongings include:   Cheetah pjs  Black Slippers  Tan bra  Red shirt  Gold necklace  Cross earrings

## 2019-09-16 NOTE — ED PROVIDER NOTES
Encounter Date: 2019       History     Chief Complaint   Patient presents with    Psychiatric Evaluation     brought in by SHIRLENE perez. Pt under an OPC placed by her aunt.      The history is provided by the patient, a relative and the police.   Mental Health Problem   The primary symptoms include dysphoric mood, bizarre behavior and somatic symptoms. The primary symptoms do not include delusions, hallucinations, disorganized speech or negative symptoms. The current episode started several days ago. This is a new problem.   The somatic symptoms began this week (pt declines to discuss what caused her to come to the ER on an OPC via pt's aunt Cyn Roberts ([phone])). Somatic symptoms do not include headaches, abdominal pain or back pain.   The onset of the illness is precipitated by drug abuse and emotional stress. Additional symptoms of the illness include insomnia, agitation, psychomotor retardation, euphoric mood, flight of ideas, inflated self-esteem, distractible and poor judgment. Additional symptoms of the illness do not include visual change, headaches or abdominal pain. She does not admit to suicidal ideas. She does not have a plan to commit suicide. She does not contemplate harming herself. She has not already injured self. She does not contemplate injuring another person. Risk factors that are present for mental illness include a history of mental illness and substance abuse (aunt OPC'd pt today for concern for jaswinder/psychosis.  aunt worried she is using drugs again and not taking her medications, poor personal hygene and aunt feels pt is a danger to herself and others.  aunt does not feel safe at home.  ).     Review of patient's allergies indicates:  No Known Allergies  Past Medical History:   Diagnosis Date    Bipolar disorder      Past Surgical History:   Procedure Laterality Date     SECTION      TUBAL LIGATION       History reviewed. No pertinent family history.  Social History      Tobacco Use    Smoking status: Current Some Day Smoker    Smokeless tobacco: Never Used   Substance Use Topics    Alcohol use: Yes     Comment: occasionally     Drug use: Yes     Review of Systems   Constitutional: Negative for fever.   HENT: Negative for sore throat.    Respiratory: Negative for shortness of breath.    Cardiovascular: Negative for chest pain.   Gastrointestinal: Negative for abdominal pain and nausea.   Genitourinary: Negative for dysuria.   Musculoskeletal: Negative for back pain.   Skin: Negative for rash.   Neurological: Negative for weakness and headaches.   Hematological: Does not bruise/bleed easily.   Psychiatric/Behavioral: Positive for agitation and dysphoric mood. Negative for hallucinations. The patient has insomnia.    All other systems reviewed and are negative.      Physical Exam     Initial Vitals [09/16/19 1659]   BP Pulse Resp Temp SpO2   (!) 185/101 95 18 99.4 °F (37.4 °C) 99 %      MAP       --         Physical Exam    Nursing note and vitals reviewed.  Constitutional: She appears well-developed and well-nourished.   HENT:   Head: Normocephalic and atraumatic.   Mouth/Throat: No oropharyngeal exudate.   Eyes: Conjunctivae and EOM are normal. Pupils are equal, round, and reactive to light.   Neck: Normal range of motion. Neck supple. No thyromegaly present.   Cardiovascular: Normal rate, regular rhythm, normal heart sounds and intact distal pulses. Exam reveals no gallop and no friction rub.    No murmur heard.  Pulmonary/Chest: Breath sounds normal. No respiratory distress. She has no wheezes. She has no rhonchi. She exhibits no tenderness.   Abdominal: Soft. Bowel sounds are normal. She exhibits no distension. There is no tenderness. There is no rebound and no guarding.   Musculoskeletal: Normal range of motion. She exhibits no edema or tenderness.   Lymphadenopathy:     She has no cervical adenopathy.   Neurological: She is alert and oriented to person, place, and time.  She has normal strength. No cranial nerve deficit or sensory deficit.   Skin: Skin is warm and dry. No rash noted.   Psychiatric: Judgment normal. Her affect is blunt and inappropriate. She is agitated. Thought content is paranoid. She expresses no homicidal and no suicidal ideation. She expresses no suicidal plans and no homicidal plans. She is noncommunicative.   Pt declining to talk to me about why she is here in ER.  All my information is coming from pt's aunts, Cyn Roberts and Constance Levin.           ED Course   Procedures  Labs Reviewed   CBC W/ AUTO DIFFERENTIAL - Abnormal; Notable for the following components:       Result Value    Hemoglobin 11.0 (*)     Hematocrit 36.2 (*)     Mean Corpuscular Volume 78 (*)     Mean Corpuscular Hemoglobin 23.7 (*)     Mean Corpuscular Hemoglobin Conc 30.4 (*)     RDW 15.2 (*)     All other components within normal limits   URINALYSIS, REFLEX TO URINE CULTURE - Abnormal; Notable for the following components:    Color, UA Red (*)     Appearance, UA Cloudy (*)     Protein, UA 2+ (*)     Ketones, UA Trace (*)     Occult Blood UA 3+ (*)     Nitrite, UA Positive (*)     Leukocytes, UA 1+ (*)     All other components within normal limits    Narrative:     Preferred Collection Type->Urine, Clean Catch   ACETAMINOPHEN LEVEL - Abnormal; Notable for the following components:    Acetaminophen (Tylenol), Serum <3.0 (*)     All other components within normal limits   URINALYSIS MICROSCOPIC - Abnormal; Notable for the following components:    RBC, UA >100 (*)     WBC, UA 20 (*)     All other components within normal limits    Narrative:     Preferred Collection Type->Urine, Clean Catch   CULTURE, URINE   COMPREHENSIVE METABOLIC PANEL   TSH   DRUG SCREEN PANEL, URINE EMERGENCY   ALCOHOL,MEDICAL (ETHANOL)   PREGNANCY TEST, URINE RAPID          Imaging Results    None                Vitals:    09/16/19 1659 09/16/19 1846   BP: (!) 185/101 (!) 157/88   Pulse: 95 99   Resp: 18 17   Temp:  "99.4 °F (37.4 °C) 99 °F (37.2 °C)   TempSrc: Oral Oral   SpO2: 99% 99%   Weight: 99.8 kg (220 lb)    Height: 5' 7" (1.702 m)        Results for orders placed or performed during the hospital encounter of 09/16/19   CBC auto differential   Result Value Ref Range    WBC 7.33 3.90 - 12.70 K/uL    RBC 4.64 4.00 - 5.40 M/uL    Hemoglobin 11.0 (L) 12.0 - 16.0 g/dL    Hematocrit 36.2 (L) 37.0 - 48.5 %    Mean Corpuscular Volume 78 (L) 82 - 98 fL    Mean Corpuscular Hemoglobin 23.7 (L) 27.0 - 31.0 pg    Mean Corpuscular Hemoglobin Conc 30.4 (L) 32.0 - 36.0 g/dL    RDW 15.2 (H) 11.5 - 14.5 %    Platelets 277 150 - 350 K/uL    MPV 9.8 9.2 - 12.9 fL    Gran # (ANC) 3.5 1.8 - 7.7 K/uL    Lymph # 3.1 1.0 - 4.8 K/uL    Mono # 0.6 0.3 - 1.0 K/uL    Eos # 0.2 0.0 - 0.5 K/uL    Baso # 0.01 0.00 - 0.20 K/uL    Gran% 47.6 38.0 - 73.0 %    Lymph% 41.6 18.0 - 48.0 %    Mono% 8.0 4.0 - 15.0 %    Eosinophil% 2.7 0.0 - 8.0 %    Basophil% 0.1 0.0 - 1.9 %    Differential Method Automated    Comprehensive metabolic panel   Result Value Ref Range    Sodium 140 136 - 145 mmol/L    Potassium 3.8 3.5 - 5.1 mmol/L    Chloride 106 95 - 110 mmol/L    CO2 25 23 - 29 mmol/L    Glucose 107 70 - 110 mg/dL    BUN, Bld 13 6 - 20 mg/dL    Creatinine 0.8 0.5 - 1.4 mg/dL    Calcium 9.3 8.7 - 10.5 mg/dL    Total Protein 7.7 6.0 - 8.4 g/dL    Albumin 4.0 3.5 - 5.2 g/dL    Total Bilirubin 0.3 0.1 - 1.0 mg/dL    Alkaline Phosphatase 64 55 - 135 U/L    AST 14 10 - 40 U/L    ALT 17 10 - 44 U/L    Anion Gap 9 8 - 16 mmol/L    eGFR if African American >60.0 >60 mL/min/1.73 m^2    eGFR if non African American >60.0 >60 mL/min/1.73 m^2   TSH   Result Value Ref Range    TSH 0.551 0.400 - 4.000 uIU/mL   Urinalysis, Reflex to Urine Culture Urine, Clean Catch   Result Value Ref Range    Specimen UA Urine, Clean Catch     Color, UA Red (A) Yellow, Straw, Charlee    Appearance, UA Cloudy (A) Clear    pH, UA 7.0 5.0 - 8.0    Specific Gravity, UA 1.010 1.005 - 1.030    Protein, " UA 2+ (A) Negative    Glucose, UA Negative Negative    Ketones, UA Trace (A) Negative    Bilirubin (UA) Negative Negative    Occult Blood UA 3+ (A) Negative    Nitrite, UA Positive (A) Negative    Urobilinogen, UA <2.0 <2.0 EU/dL    Leukocytes, UA 1+ (A) Negative   Drug screen panel, emergency   Result Value Ref Range    Benzodiazepines Negative     Methadone metabolites Negative     Cocaine (Metab.) Negative     Opiate Scrn, Ur Negative     Barbiturate Screen, Ur Negative     Amphetamine Screen, Ur Negative     THC Presumptive Positive     Phencyclidine Negative     Creatinine, Random Ur 98.7 15.0 - 325.0 mg/dL    Toxicology Information SEE COMMENT    Ethanol   Result Value Ref Range    Alcohol, Medical, Serum <10 <10 mg/dL   Acetaminophen level   Result Value Ref Range    Acetaminophen (Tylenol), Serum <3.0 (L) 10.0 - 20.0 ug/mL   Pregnancy, urine rapid   Result Value Ref Range    Preg Test, Ur Negative    Urinalysis Microscopic   Result Value Ref Range    RBC, UA >100 (H) 0 - 4 /hpf    WBC, UA 20 (H) 0 - 5 /hpf    Bacteria None None-Occ /hpf    Squam Epithel, UA 10 /hpf    Hyaline Casts, UA 0 0-1/lpf /lpf    Microscopic Comment SEE COMMENT          Imaging Results    None         Medications   ziprasidone injection 20 mg (20 mg Intramuscular Not Given 9/16/19 1715)          - Re-evaluation:  The patient is resting comfortably and is in no acute distress. Discussed test results and notified of pending labs. Answered questions at this time.          Medication List      You have not been prescribed any medications.        Current Discharge Medication List          6:31 PM I assumed care at 6:00 p.m., discussed in detail with Dr. Erazo, patient interviewed and examined, chart reviewed, data reviewed.  Presents with exacerbation manic behavior and apparent psychosis, under OPC and PEC, laboratory data now available and stable, she is medically cleared for inpatient psychiatric placement and transfer.  Will  continue to monitor while she remains in this department.    All historical, clinical, radiographic, and laboratory findings were reviewed with the patient/family in detail along with the indications for transfer to an outside facility (rather than admission to our facility in [place]) secondary to jaswinder/ psychosis and a need for inpatient Psych eval and rx given the diagnosis of bipolar affective disorder.  All remaining questions and concerns were addressed at that time and the patient/family communicates understanding and agrees to proceed accordingly.  Similarly all pertinent details of the encounter were discussed with Dr. Montelongo -  inpatient Psych - [facility] who agrees to accept the patient in transfer based on the needs/patient preferences outlined above.  Patient will be transferred by [facility] secondary to a need for ongoing psychiatric precautions en route.    Eris Pablo MD  6:32 PM           ED Diagnosis  1. Manic behavior                          Clinical Impression:       ICD-10-CM ICD-9-CM   1. Manic behavior F30.10 296.00         Disposition:   Disposition: Transferred  Condition: Stable  Inpatient Psych - [facility] - Dr. Montelongo.                        Eris Pablo MD  09/16/19 1834       Eris Pablo MD  09/16/19 2032       Eris Pablo MD  09/16/19 2037

## 2019-09-16 NOTE — ED NOTES
Pt refusing to get into sanchez gown. ARTURO Caal and md aware. Masha, RT is at the bedside for direct observation. Pt's room is clear per PEC protocol.

## 2019-09-16 NOTE — ED NOTES
Pt awake and calm at this time. PEC safety precautions remain in place. Inge Cortez RT is at the bedside for direct observation. Will continue to monitor.

## 2019-09-16 NOTE — ED NOTES
To bedside with day shift nurse alireza. Pt calm and cooperative and denies wanting injection at present.pt denies wanting to harm self or others.

## 2019-09-17 VITALS
OXYGEN SATURATION: 100 % | RESPIRATION RATE: 17 BRPM | BODY MASS INDEX: 34.53 KG/M2 | TEMPERATURE: 99 F | HEART RATE: 67 BPM | WEIGHT: 220 LBS | DIASTOLIC BLOOD PRESSURE: 73 MMHG | HEIGHT: 67 IN | SYSTOLIC BLOOD PRESSURE: 126 MMHG

## 2019-09-17 NOTE — ED NOTES
spd here 2 attendants. Pt aaox3, skin warm and dry. Pt calm and cooperative.  assist out to care.pt changed to scrubs and new underwear provided and pt changed.

## 2019-09-17 NOTE — ED NOTES
Attempted to contact patient father and aunt. No answer by both parties.       Patient departed from facility at this time in the care of 2 SPD officials, 1IPSO, and 1 ERT.

## 2019-09-17 NOTE — ED NOTES
Pt eating sandwich and drinking juice. Pt calm and cooperative and without any other needs at this time.

## 2019-09-17 NOTE — ED NOTES
Rebecca with c called and pt accepted to [facility] [address]. Per intake Harriet. Report number [phone]. Address: [address] [place] [address] [place] [address].Dr. Pablo and charge elena resendiz

## 2019-09-18 LAB
BACTERIA UR CULT: NORMAL
BACTERIA UR CULT: NORMAL

## 2019-11-20 ENCOUNTER — HOSPITAL ENCOUNTER (EMERGENCY)
Facility: HOSPITAL | Age: 34
Discharge: HOME | End: 2019-11-20
Attending: EMERGENCY MEDICINE
Payer: MEDICAID

## 2019-11-20 VITALS
RESPIRATION RATE: 12 BRPM | HEART RATE: 97 BPM | DIASTOLIC BLOOD PRESSURE: 81 MMHG | HEIGHT: 66 IN | BODY MASS INDEX: 35.56 KG/M2 | OXYGEN SATURATION: 97 % | SYSTOLIC BLOOD PRESSURE: 131 MMHG | WEIGHT: 221.25 LBS | TEMPERATURE: 98 F

## 2019-11-20 DIAGNOSIS — N61.1 ABSCESS OF BREAST, RIGHT: Primary | ICD-10-CM

## 2019-11-20 PROCEDURE — 99283 EMERGENCY DEPT VISIT LOW MDM: CPT | Mod: ER

## 2019-11-20 RX ORDER — CLINDAMYCIN HYDROCHLORIDE 150 MG/1
300 CAPSULE ORAL 4 TIMES DAILY
Qty: 56 CAPSULE | Refills: 0 | Status: SHIPPED | OUTPATIENT
Start: 2019-11-20 | End: 2019-11-27

## 2019-11-21 NOTE — ED NOTES
Patient's right breast in increased in size compared to left breast. Patient's rt breast swollen, redness, w/ drainage serosanguinous drainage noted from wounds x4 underneath rt breast. Wound bed redness w/ yellow exudate noted to wound x1, wound x3 has red wound bed noted. Tenderness to touch noted to rt breast.

## 2019-11-21 NOTE — ED NOTES
"10:24 PM Pt presents to ED c/o "boil" to R breast x6 days. Reports that "the boil popped yesterday" and sts it has been draining some blood and clear fluid. Reports pain 6/10, denies fevers. Pt tachy on monitor, will reassess.   Pt placed in gown.  "

## 2019-11-23 NOTE — ED PROVIDER NOTES
"Encounter Date: 2019       History     Chief Complaint   Patient presents with    Recurrent Skin Infections     boil on R breast x6 days, "boil popped yesterday"     Patient presents for evaluation of a cutaneous abscess. Lesion is located under the RIGHT breast. Onset was noted 6 days ago.  Symptoms have gradually improved following spontaneous drainage from the site. Abscess has associated symptoms of pain and swelling. Patient does not have previous history of cutaneous abscesses. Patient does not have diabetes.        Review of patient's allergies indicates:  No Known Allergies  Past Medical History:   Diagnosis Date    Bipolar disorder      Past Surgical History:   Procedure Laterality Date     SECTION      TUBAL LIGATION       History reviewed. No pertinent family history.  Social History     Tobacco Use    Smoking status: Current Some Day Smoker    Smokeless tobacco: Never Used   Substance Use Topics    Alcohol use: Yes     Comment: occasionally     Drug use: Yes     Review of Systems   Constitutional: Negative for chills and fever.   HENT: Negative for congestion and rhinorrhea.    Respiratory: Negative for cough, chest tightness, shortness of breath and wheezing.    Cardiovascular: Negative for chest pain, palpitations and leg swelling.   Gastrointestinal: Negative for abdominal pain, constipation, diarrhea, nausea and vomiting.   Genitourinary: Negative for dysuria, frequency, urgency, vaginal bleeding and vaginal discharge.   Skin: Negative for color change and rash.   Allergic/Immunologic: Negative for immunocompromised state.   Neurological: Negative for dizziness, weakness and numbness.   Hematological: Negative for adenopathy. Does not bruise/bleed easily.   All other systems reviewed and are negative.      Physical Exam     Initial Vitals [19 2221]   BP Pulse Resp Temp SpO2   (!) 142/80 (!) 115 16 98.1 °F (36.7 °C) 98 %      MAP       --         Physical Exam    Nursing " note and vitals reviewed.  Constitutional: She appears well-developed and well-nourished. She is not diaphoretic. No distress.   HENT:   Head: Normocephalic and atraumatic.   Cardiovascular: Normal rate, regular rhythm, normal heart sounds and intact distal pulses.   Pulmonary/Chest: Breath sounds normal. No respiratory distress. Right breast exhibits no inverted nipple, no mass and no nipple discharge. Left breast exhibits no inverted nipple, no mass and no nipple discharge.       Neurological: She is alert and oriented to person, place, and time.   Skin: Skin is warm and dry.         ED Course   Procedures  Labs Reviewed - No data to display       Imaging Results    None          Medical Decision Making:   ED Management:  All findings were reviewed with the patient/family in detail.  Abscess well drained via spontaneous eruption.  Will initiate ABX pending FU.  I see no indication of an emergent process beyond that addressed during our encounter but have duly counseled the patient/family regarding the need for prompt follow-up as well as the indications that should prompt immediate return to the emergency room should new or worrisome developments occur.  The patient has additionally been provided with printed information regarding diagnosis as well as instructions regarding follow up and any medications intended to manage the patient's aforementioned conditions.  The patient/family communicates understanding of all this information and all remaining questions and concerns were addressed at this time.                                       Clinical Impression:       ICD-10-CM ICD-9-CM   1. Abscess of breast, right N61.1 611.0                             Mohamud Wolf MD  11/23/19 5695

## 2020-06-08 ENCOUNTER — HOSPITAL ENCOUNTER (EMERGENCY)
Facility: HOSPITAL | Age: 35
Discharge: LEFT BEFORE DISCHARGE | End: 2020-06-08
Attending: EMERGENCY MEDICINE
Payer: MEDICAID

## 2020-06-08 VITALS
TEMPERATURE: 98 F | HEART RATE: 86 BPM | HEIGHT: 65 IN | WEIGHT: 240 LBS | SYSTOLIC BLOOD PRESSURE: 138 MMHG | RESPIRATION RATE: 16 BRPM | BODY MASS INDEX: 39.99 KG/M2 | OXYGEN SATURATION: 99 % | DIASTOLIC BLOOD PRESSURE: 89 MMHG

## 2020-06-08 DIAGNOSIS — R06.02 SHORTNESS OF BREATH: ICD-10-CM

## 2020-06-08 DIAGNOSIS — M79.10 MYALGIA: Primary | ICD-10-CM

## 2020-06-08 LAB
ALBUMIN SERPL BCP-MCNC: 4.5 G/DL (ref 3.5–5.2)
ALP SERPL-CCNC: 74 U/L (ref 55–135)
ALT SERPL W/O P-5'-P-CCNC: 17 U/L (ref 10–44)
ANION GAP SERPL CALC-SCNC: 15 MMOL/L (ref 8–16)
AST SERPL-CCNC: 23 U/L (ref 10–40)
BASOPHILS # BLD AUTO: 0.01 K/UL (ref 0–0.2)
BASOPHILS NFR BLD: 0.1 % (ref 0–1.9)
BILIRUB SERPL-MCNC: 0.6 MG/DL (ref 0.1–1)
BUN SERPL-MCNC: 27 MG/DL (ref 6–20)
CALCIUM SERPL-MCNC: 10.3 MG/DL (ref 8.7–10.5)
CHLORIDE SERPL-SCNC: 103 MMOL/L (ref 95–110)
CO2 SERPL-SCNC: 19 MMOL/L (ref 23–29)
CREAT SERPL-MCNC: 1.5 MG/DL (ref 0.5–1.4)
DIFFERENTIAL METHOD: ABNORMAL
EOSINOPHIL # BLD AUTO: 0 K/UL (ref 0–0.5)
EOSINOPHIL NFR BLD: 0.1 % (ref 0–8)
ERYTHROCYTE [DISTWIDTH] IN BLOOD BY AUTOMATED COUNT: 14.8 % (ref 11.5–14.5)
EST. GFR  (AFRICAN AMERICAN): 52.4 ML/MIN/1.73 M^2
EST. GFR  (NON AFRICAN AMERICAN): 45.4 ML/MIN/1.73 M^2
GLUCOSE SERPL-MCNC: 108 MG/DL (ref 70–110)
HCT VFR BLD AUTO: 41.8 % (ref 37–48.5)
HGB BLD-MCNC: 13 G/DL (ref 12–16)
IMM GRANULOCYTES # BLD AUTO: 0.03 K/UL (ref 0–0.04)
IMM GRANULOCYTES NFR BLD AUTO: 0.2 % (ref 0–0.5)
LYMPHOCYTES # BLD AUTO: 1.7 K/UL (ref 1–4.8)
LYMPHOCYTES NFR BLD: 14 % (ref 18–48)
MCH RBC QN AUTO: 24.4 PG (ref 27–31)
MCHC RBC AUTO-ENTMCNC: 31.1 G/DL (ref 32–36)
MCV RBC AUTO: 79 FL (ref 82–98)
MONOCYTES # BLD AUTO: 0.8 K/UL (ref 0.3–1)
MONOCYTES NFR BLD: 6.6 % (ref 4–15)
NEUTROPHILS # BLD AUTO: 9.6 K/UL (ref 1.8–7.7)
NEUTROPHILS NFR BLD: 79 % (ref 38–73)
NRBC BLD-RTO: 0 /100 WBC
PLATELET # BLD AUTO: 291 K/UL (ref 150–350)
PMV BLD AUTO: 9.7 FL (ref 9.2–12.9)
POTASSIUM SERPL-SCNC: 3.5 MMOL/L (ref 3.5–5.1)
PROT SERPL-MCNC: 9.3 G/DL (ref 6–8.4)
RBC # BLD AUTO: 5.32 M/UL (ref 4–5.4)
SODIUM SERPL-SCNC: 137 MMOL/L (ref 136–145)
WBC # BLD AUTO: 12.09 K/UL (ref 3.9–12.7)

## 2020-06-08 PROCEDURE — 93010 EKG 12-LEAD: ICD-10-PCS | Mod: ,,, | Performed by: INTERNAL MEDICINE

## 2020-06-08 PROCEDURE — 80053 COMPREHEN METABOLIC PANEL: CPT | Mod: ER

## 2020-06-08 PROCEDURE — 93005 ELECTROCARDIOGRAM TRACING: CPT | Mod: ER

## 2020-06-08 PROCEDURE — 85025 COMPLETE CBC W/AUTO DIFF WBC: CPT | Mod: ER

## 2020-06-08 PROCEDURE — 93010 ELECTROCARDIOGRAM REPORT: CPT | Mod: ,,, | Performed by: INTERNAL MEDICINE

## 2020-06-08 PROCEDURE — 99284 EMERGENCY DEPT VISIT MOD MDM: CPT | Mod: 25,ER

## 2020-06-08 NOTE — ED PROVIDER NOTES
Encounter Date: 2020       History     Chief Complaint   Patient presents with    Generalized Body Aches     x 2 weeks back is the worst,    Cough     x 2 weeks off and on SOB     Chief complaint:  Generalized body aches    History of present illness:  34-year-old female complains of generalized body aches for 1 week, contradicting the 2 weeks she said at triage but when re-questioned she said it has been for only 1 week.  She also complains of some cough and congestion mild associated with some shortness of breath especially with exertion.  Denies any fever or chills.  Severity of symptoms is mild-to-moderate.  Exam stating symptoms include movement producing back pain.  No meds taken prior to arrival.  No known exposure to infectious illness.  Denies any chest pain.  Relates back pain is a major source of her discomfort but is consistent with generalized achiness as well.  No complaints of dysuria or unilateral flank pain.        Review of patient's allergies indicates:  No Known Allergies  Past Medical History:   Diagnosis Date    Bipolar disorder      Past Surgical History:   Procedure Laterality Date     SECTION      TUBAL LIGATION       History reviewed. No pertinent family history.  Social History     Tobacco Use    Smoking status: Current Some Day Smoker    Smokeless tobacco: Never Used   Substance Use Topics    Alcohol use: Yes     Comment: occasionally     Drug use: Yes     Review of Systems   Constitutional: Negative for activity change, appetite change, chills, diaphoresis, fatigue and fever.   HENT: Negative.    Eyes: Negative.    Respiratory: Positive for cough and shortness of breath (Nonproductive). Negative for chest tightness.    Cardiovascular: Negative for chest pain and leg swelling.   Genitourinary: Negative.    Musculoskeletal: Positive for back pain and myalgias. Negative for neck pain and neck stiffness.   Skin: Negative.    Neurological: Negative.     Psychiatric/Behavioral: Negative.    All other systems reviewed and are negative.      Physical Exam     Initial Vitals [06/08/20 1811]   BP Pulse Resp Temp SpO2   (!) 145/85 110 20 98.4 °F (36.9 °C) 100 %      MAP       --         Physical Exam    Nursing note and vitals reviewed.  Constitutional: She appears well-developed and well-nourished.   HENT:   Head: Normocephalic and atraumatic.   Right Ear: External ear normal.   Left Ear: External ear normal.   Nose: Nose normal.   Mouth/Throat: Oropharynx is clear and moist. No oropharyngeal exudate.   Eyes: Conjunctivae and EOM are normal. Pupils are equal, round, and reactive to light. Right eye exhibits no discharge. Left eye exhibits no discharge. No scleral icterus.   Neck: Normal range of motion. Neck supple.   Cardiovascular: Regular rhythm and normal pulses. Tachycardia present.    Pulmonary/Chest: Breath sounds normal. No respiratory distress. She has no wheezes. She has no rhonchi. She has no rales. She exhibits no tenderness.   Abdominal: Soft. Bowel sounds are normal.   Musculoskeletal: Normal range of motion.   Lymphadenopathy:     She has no cervical adenopathy.   Neurological: She is alert and oriented to person, place, and time. She has normal strength. GCS score is 15. GCS eye subscore is 4. GCS verbal subscore is 5. GCS motor subscore is 6.   Skin: Skin is warm and dry. Capillary refill takes less than 2 seconds. No rash noted.   Psychiatric: Her behavior is normal. Thought content normal.         ED Course   Procedures  Labs Reviewed   CBC W/ AUTO DIFFERENTIAL   COMPREHENSIVE METABOLIC PANEL   URINALYSIS   PREGNANCY TEST, URINE RAPID     EKG Readings: (Independently Interpreted)   Initial Reading: No STEMI. Rhythm: Sinus Tachycardia. Heart Rate: 118. Ectopy: PVCs Rare. Conduction: Normal. ST Segments: Normal ST Segments. T Waves: Normal. Axis: Normal. Clinical Impression: Sinus Tachycardia       Imaging Results    None          Medical Decision  Making:   ED Management:  Pt in no distress throughout the ED stay. But refuses to give urinalysis after stating she forgot to pee in the cup. Subsequently refused to provide urine and also refused CXR. Advised these tests would help diagnose her condition, but she continued to refuse,. She is capable of making an informed decision and understands the nature of her departure being against medical advice, but her condition is improved and she does state she feels better. Advised to return if any concerns.                                 Clinical Impression:                ICD-10-CM ICD-9-CM   1. Myalgia M79.10 729.1   2. Shortness of breath R06.02 786.05            Jimy Mcduffie MD  06/10/20 0030

## 2020-06-09 NOTE — ED NOTES
3rd Attempted to collect urine from pr, she stated she was unable to produce any urine at this time.

## 2020-06-09 NOTE — ED NOTES
Received report from Miguelito DURANT, assuming patient xcare at this time. Pt lying in bed , c.o of muscle spasms, generalized pain and weakness for the passed two weeks. Pt AAOx4. Pt, prior to my assuming pr care, the pt had gone to the restroom and forget to collect UA.

## 2020-06-09 NOTE — ED NOTES
2nd Attempted to collect urine from pr, she stated she was unable to produce any urine at this time.

## 2020-06-09 NOTE — ED NOTES
Reta RUTH in room to discuss leaving AMA. Pt understands the risks and decides to sign the AMA form anyway. IV discontinued and normal vitals taken. Pt ambulates out of ED without trouble. Pt in no resp distress.

## 2020-06-16 ENCOUNTER — HOSPITAL ENCOUNTER (EMERGENCY)
Facility: HOSPITAL | Age: 35
Discharge: OTHER INSTITUTION | End: 2020-06-17
Attending: EMERGENCY MEDICINE
Payer: MEDICAID

## 2020-06-16 DIAGNOSIS — Z20.2 EXPOSURE TO STD: ICD-10-CM

## 2020-06-16 DIAGNOSIS — R05.9 COUGH: ICD-10-CM

## 2020-06-16 DIAGNOSIS — K81.0 CHOLECYSTITIS, ACUTE: Primary | ICD-10-CM

## 2020-06-16 PROCEDURE — 99285 EMERGENCY DEPT VISIT HI MDM: CPT | Mod: 25,ER

## 2020-06-16 PROCEDURE — 81025 URINE PREGNANCY TEST: CPT | Mod: ER

## 2020-06-16 PROCEDURE — 96365 THER/PROPH/DIAG IV INF INIT: CPT | Mod: ER

## 2020-06-16 PROCEDURE — 81003 URINALYSIS AUTO W/O SCOPE: CPT | Mod: ER

## 2020-06-16 RX ORDER — AZITHROMYCIN 250 MG/1
1000 TABLET, FILM COATED ORAL
Status: COMPLETED | OUTPATIENT
Start: 2020-06-17 | End: 2020-06-17

## 2020-06-16 RX ORDER — ACETAMINOPHEN 325 MG/1
650 TABLET ORAL
Status: COMPLETED | OUTPATIENT
Start: 2020-06-17 | End: 2020-06-17

## 2020-06-17 VITALS
HEART RATE: 82 BPM | DIASTOLIC BLOOD PRESSURE: 85 MMHG | RESPIRATION RATE: 18 BRPM | SYSTOLIC BLOOD PRESSURE: 133 MMHG | TEMPERATURE: 98 F | OXYGEN SATURATION: 100 % | WEIGHT: 213.63 LBS | BODY MASS INDEX: 35.55 KG/M2

## 2020-06-17 LAB
ALBUMIN SERPL BCP-MCNC: 3.7 G/DL (ref 3.5–5.2)
ALP SERPL-CCNC: 60 U/L (ref 55–135)
ALT SERPL W/O P-5'-P-CCNC: 16 U/L (ref 10–44)
ANION GAP SERPL CALC-SCNC: 12 MMOL/L (ref 8–16)
AST SERPL-CCNC: 16 U/L (ref 10–40)
B-HCG UR QL: NEGATIVE
BACTERIA GENITAL QL WET PREP: ABNORMAL
BASOPHILS # BLD AUTO: 0.01 K/UL (ref 0–0.2)
BASOPHILS NFR BLD: 0.1 % (ref 0–1.9)
BILIRUB SERPL-MCNC: 0.3 MG/DL (ref 0.1–1)
BILIRUB UR QL STRIP: NEGATIVE
BUN SERPL-MCNC: 11 MG/DL (ref 6–20)
CALCIUM SERPL-MCNC: 9.2 MG/DL (ref 8.7–10.5)
CHLORIDE SERPL-SCNC: 105 MMOL/L (ref 95–110)
CLARITY UR REFRACT.AUTO: ABNORMAL
CLUE CELLS VAG QL WET PREP: ABNORMAL
CO2 SERPL-SCNC: 22 MMOL/L (ref 23–29)
COLOR UR AUTO: YELLOW
CREAT SERPL-MCNC: 0.8 MG/DL (ref 0.5–1.4)
DIFFERENTIAL METHOD: ABNORMAL
EOSINOPHIL # BLD AUTO: 0 K/UL (ref 0–0.5)
EOSINOPHIL NFR BLD: 0.4 % (ref 0–8)
ERYTHROCYTE [DISTWIDTH] IN BLOOD BY AUTOMATED COUNT: 14.8 % (ref 11.5–14.5)
EST. GFR  (AFRICAN AMERICAN): >60 ML/MIN/1.73 M^2
EST. GFR  (NON AFRICAN AMERICAN): >60 ML/MIN/1.73 M^2
FILAMENT FUNGI VAG WET PREP-#/AREA: ABNORMAL
GLUCOSE SERPL-MCNC: 153 MG/DL (ref 70–110)
GLUCOSE UR QL STRIP: NEGATIVE
HCT VFR BLD AUTO: 38.4 % (ref 37–48.5)
HGB BLD-MCNC: 12 G/DL (ref 12–16)
HGB UR QL STRIP: NEGATIVE
HIV 1+2 AB+HIV1 P24 AG SERPL QL IA: NEGATIVE
IMM GRANULOCYTES # BLD AUTO: 0.01 K/UL (ref 0–0.04)
IMM GRANULOCYTES NFR BLD AUTO: 0.1 % (ref 0–0.5)
KETONES UR QL STRIP: NEGATIVE
LEUKOCYTE ESTERASE UR QL STRIP: NEGATIVE
LYMPHOCYTES # BLD AUTO: 2.3 K/UL (ref 1–4.8)
LYMPHOCYTES NFR BLD: 31.5 % (ref 18–48)
MCH RBC QN AUTO: 24.8 PG (ref 27–31)
MCHC RBC AUTO-ENTMCNC: 31.3 G/DL (ref 32–36)
MCV RBC AUTO: 79 FL (ref 82–98)
MONOCYTES # BLD AUTO: 0.5 K/UL (ref 0.3–1)
MONOCYTES NFR BLD: 6.2 % (ref 4–15)
NEUTROPHILS # BLD AUTO: 4.4 K/UL (ref 1.8–7.7)
NEUTROPHILS NFR BLD: 61.7 % (ref 38–73)
NITRITE UR QL STRIP: NEGATIVE
NRBC BLD-RTO: 0 /100 WBC
PH UR STRIP: 6 [PH] (ref 5–8)
PLATELET # BLD AUTO: 269 K/UL (ref 150–350)
PMV BLD AUTO: 10.2 FL (ref 9.2–12.9)
POTASSIUM SERPL-SCNC: 3.4 MMOL/L (ref 3.5–5.1)
PROT SERPL-MCNC: 7.9 G/DL (ref 6–8.4)
PROT UR QL STRIP: NEGATIVE
RBC # BLD AUTO: 4.84 M/UL (ref 4–5.4)
SARS-COV-2 RDRP RESP QL NAA+PROBE: NEGATIVE
SODIUM SERPL-SCNC: 139 MMOL/L (ref 136–145)
SP GR UR STRIP: 1.02 (ref 1–1.03)
SPECIMEN SOURCE: ABNORMAL
T VAGINALIS GENITAL QL WET PREP: ABNORMAL
URN SPEC COLLECT METH UR: ABNORMAL
UROBILINOGEN UR STRIP-ACNC: <2 EU/DL
WBC # BLD AUTO: 7.21 K/UL (ref 3.9–12.7)
WBC #/AREA VAG WET PREP: ABNORMAL
YEAST GENITAL QL WET PREP: ABNORMAL

## 2020-06-17 PROCEDURE — 86703 HIV-1/HIV-2 1 RESULT ANTBDY: CPT

## 2020-06-17 PROCEDURE — 25000003 PHARM REV CODE 250: Mod: ER | Performed by: EMERGENCY MEDICINE

## 2020-06-17 PROCEDURE — 80053 COMPREHEN METABOLIC PANEL: CPT | Mod: ER

## 2020-06-17 PROCEDURE — 63700000 PHARM REV CODE 250 ALT 637 W/O HCPCS: Mod: ER | Performed by: EMERGENCY MEDICINE

## 2020-06-17 PROCEDURE — 25500020 PHARM REV CODE 255: Mod: ER | Performed by: EMERGENCY MEDICINE

## 2020-06-17 PROCEDURE — 63600175 PHARM REV CODE 636 W HCPCS: Mod: ER | Performed by: EMERGENCY MEDICINE

## 2020-06-17 PROCEDURE — U0002 COVID-19 LAB TEST NON-CDC: HCPCS | Mod: ER

## 2020-06-17 PROCEDURE — 87491 CHLMYD TRACH DNA AMP PROBE: CPT

## 2020-06-17 PROCEDURE — 85025 COMPLETE CBC W/AUTO DIFF WBC: CPT | Mod: ER

## 2020-06-17 PROCEDURE — 87210 SMEAR WET MOUNT SALINE/INK: CPT | Mod: ER

## 2020-06-17 RX ADMIN — CEFTRIAXONE 1 G: 1 INJECTION, SOLUTION INTRAVENOUS at 12:06

## 2020-06-17 RX ADMIN — IOHEXOL 75 ML: 350 INJECTION, SOLUTION INTRAVENOUS at 02:06

## 2020-06-17 RX ADMIN — ACETAMINOPHEN 650 MG: 325 TABLET ORAL at 12:06

## 2020-06-17 RX ADMIN — AZITHROMYCIN MONOHYDRATE 1000 MG: 250 TABLET ORAL at 12:06

## 2020-06-17 NOTE — ED NOTES
Pt resting with eyes open, denies discomfort, SOB, or pain. Pt updated on POC, and awaiting CT report

## 2020-06-17 NOTE — ED NOTES
"Pt AAOx3, presently denies chest pain or SOB. Pt stated " I have been feeling bad all week and last week I had some SOB and came here. Today I am not feeling well so I came in." Pt follows commands, good ROM, ambulates well. Respirations are 20, radial pulse at 85. Pt became nervious and refused COVID 19 test after the nurse explained the procedure. Pt teaching performed and pt refused the COVID 19 test again, physician made aware.  "

## 2020-06-17 NOTE — ED NOTES
Pt talking talking with family members , AAOx3, denies chest pain, SOB, ambulating well. Vitals stable, awaiting [facility] for transport.Pt states no further needs or concerns.

## 2020-06-17 NOTE — ED PROVIDER NOTES
Encounter Date: 2020       History     Chief Complaint   Patient presents with    STD CHECK     states she was exposed to someone that she heard had an std. + lower abd pain      The history is provided by the patient.   Female  Problem  Primary symptoms include discharge, a fever.   This is a new problem. The current episode started several days ago. The problem occurs daily. The problem has been unchanged. She is not pregnant. The discharge was green. Associated symptoms include abdominal pain. Pertinent negatives include no vomiting. Sexual activity: sexually active. There is a concern regarding sexually transmitted diseases.   Pt reports episodic tenderness to epigastric region after eating certain foods. Pt reports vaginal discharge and exposure to STD. Pt also c/o cough.    Review of patient's allergies indicates:  No Known Allergies  Past Medical History:   Diagnosis Date    Bipolar disorder      Past Surgical History:   Procedure Laterality Date     SECTION      TUBAL LIGATION       History reviewed. No pertinent family history.  Social History     Tobacco Use    Smoking status: Current Some Day Smoker    Smokeless tobacco: Never Used   Substance Use Topics    Alcohol use: Yes     Comment: occasionally     Drug use: Yes     Review of Systems   Constitutional: Positive for fever.   Respiratory: Positive for cough.    Gastrointestinal: Positive for abdominal pain. Negative for vomiting.   Genitourinary: Positive for vaginal discharge.       Physical Exam     Initial Vitals [20 2339]   BP Pulse Resp Temp SpO2   (!) 152/107 103 18 99.9 °F (37.7 °C) 98 %      MAP       --         Physical Exam    Nursing note and vitals reviewed.  Constitutional: She appears well-developed and well-nourished. No distress.   HENT:   Head: Normocephalic and atraumatic.   Mouth/Throat: Oropharynx is clear and moist.   Eyes: Conjunctivae and EOM are normal. Pupils are equal, round, and reactive to light.    Neck: Normal range of motion. Neck supple.   Cardiovascular: Normal rate, regular rhythm and normal heart sounds.   Pulmonary/Chest: Breath sounds normal. No respiratory distress.   Abdominal: Soft. Bowel sounds are normal. She exhibits no distension. There is abdominal tenderness. There is no rebound and no guarding.   Mild diffuse abd tenderness No R/G   Musculoskeletal: Normal range of motion.   Neurological: She is alert and oriented to person, place, and time. She has normal strength.   Skin: Skin is warm and dry.   Psychiatric: She has a normal mood and affect. Thought content normal.         ED Course   Procedures  Labs Reviewed   CBC W/ AUTO DIFFERENTIAL - Abnormal; Notable for the following components:       Result Value    Mean Corpuscular Volume 79 (*)     Mean Corpuscular Hemoglobin 24.8 (*)     Mean Corpuscular Hemoglobin Conc 31.3 (*)     RDW 14.8 (*)     All other components within normal limits   COMPREHENSIVE METABOLIC PANEL - Abnormal; Notable for the following components:    Potassium 3.4 (*)     CO2 22 (*)     Glucose 153 (*)     All other components within normal limits   VAGINAL SCREEN - Abnormal; Notable for the following components:    Clue Cells Few (*)     WBC - Vaginal Screen Rare (*)     Bacteria - Vaginal Screen Occasional (*)     All other components within normal limits   URINALYSIS, REFLEX TO URINE CULTURE - Abnormal; Notable for the following components:    Appearance, UA Hazy (*)     All other components within normal limits    Narrative:     Preferred Collection Type->Urine, Clean Catch  Specimen Source->Urine   C. TRACHOMATIS/N. GONORRHOEAE BY AMP DNA   PREGNANCY TEST, URINE RAPID    Narrative:     Specimen Source->Urine   SARS-COV-2 RNA AMPLIFICATION, QUAL   HIV 1 / 2 ANTIBODY     Results for orders placed or performed during the hospital encounter of 06/16/20   CBC auto differential   Result Value Ref Range    WBC 7.21 3.90 - 12.70 K/uL    RBC 4.84 4.00 - 5.40 M/uL     Hemoglobin 12.0 12.0 - 16.0 g/dL    Hematocrit 38.4 37.0 - 48.5 %    Mean Corpuscular Volume 79 (L) 82 - 98 fL    Mean Corpuscular Hemoglobin 24.8 (L) 27.0 - 31.0 pg    Mean Corpuscular Hemoglobin Conc 31.3 (L) 32.0 - 36.0 g/dL    RDW 14.8 (H) 11.5 - 14.5 %    Platelets 269 150 - 350 K/uL    MPV 10.2 9.2 - 12.9 fL    Immature Granulocytes 0.1 0.0 - 0.5 %    Gran # (ANC) 4.4 1.8 - 7.7 K/uL    Immature Grans (Abs) 0.01 0.00 - 0.04 K/uL    Lymph # 2.3 1.0 - 4.8 K/uL    Mono # 0.5 0.3 - 1.0 K/uL    Eos # 0.0 0.0 - 0.5 K/uL    Baso # 0.01 0.00 - 0.20 K/uL    nRBC 0 0 /100 WBC    Gran% 61.7 38.0 - 73.0 %    Lymph% 31.5 18.0 - 48.0 %    Mono% 6.2 4.0 - 15.0 %    Eosinophil% 0.4 0.0 - 8.0 %    Basophil% 0.1 0.0 - 1.9 %    Differential Method Automated    Comprehensive metabolic panel   Result Value Ref Range    Sodium 139 136 - 145 mmol/L    Potassium 3.4 (L) 3.5 - 5.1 mmol/L    Chloride 105 95 - 110 mmol/L    CO2 22 (L) 23 - 29 mmol/L    Glucose 153 (H) 70 - 110 mg/dL    BUN, Bld 11 6 - 20 mg/dL    Creatinine 0.8 0.5 - 1.4 mg/dL    Calcium 9.2 8.7 - 10.5 mg/dL    Total Protein 7.9 6.0 - 8.4 g/dL    Albumin 3.7 3.5 - 5.2 g/dL    Total Bilirubin 0.3 0.1 - 1.0 mg/dL    Alkaline Phosphatase 60 55 - 135 U/L    AST 16 10 - 40 U/L    ALT 16 10 - 44 U/L    Anion Gap 12 8 - 16 mmol/L    eGFR if African American >60.0 >60 mL/min/1.73 m^2    eGFR if non African American >60.0 >60 mL/min/1.73 m^2   Vaginal Screen Vagina   Result Value Ref Range    Trichomonas None None    Clue Cells Few (A) None    Budding Yeast None None    Fungal Hyphae None None    WBC - Vaginal Screen Rare (A) None    Bacteria - Vaginal Screen Occasional (A) None    Wet Prep Source Vagina    Urinalysis, Reflex to Urine Culture Urine, Clean Catch    Specimen: Urine   Result Value Ref Range    Specimen UA Urine, Clean Catch     Color, UA Yellow Yellow, Straw, Charlee    Appearance, UA Hazy (A) Clear    pH, UA 6.0 5.0 - 8.0    Specific Gravity, UA 1.020 1.005 - 1.030     Protein, UA Negative Negative    Glucose, UA Negative Negative    Ketones, UA Negative Negative    Bilirubin (UA) Negative Negative    Occult Blood UA Negative Negative    Nitrite, UA Negative Negative    Urobilinogen, UA <2.0 <2.0 EU/dL    Leukocytes, UA Negative Negative   Pregnancy, urine rapid (UPT)   Result Value Ref Range    Preg Test, Ur Negative    COVID-19 Rapid Screening   Result Value Ref Range    SARS-CoV-2 RNA, Amplification, Qual Negative Negative            Imaging Results          X-Ray Chest 1 View (Preliminary result)  Result time 06/17/20 02:43:07    ED Interpretation by Binh Gonzalez MD (06/17/20 02:43:07, [facility], Emergency Medicine)    NAF                             CT Abdomen Pelvis With Contrast (In process)  Result time 06/17/20 00:50:35            Acute Calculus Cholecystitis per statrad    2:58 AM Discussed lab/imaging studies with patient and the need for further evaluation/admission for Cholecystitis. Pt offered transfer to [facility] for Surgical consult but requests transfer to [facility]. Pt verbalized understanding that this is a stand alone ER and we are unable to admit at this facility. Pt will be transferred to [facility] via [facility] with care en route to transport. I discussed this case with HS and care was accepted by Dr Aly.                                         Clinical Impression:       ICD-10-CM ICD-9-CM   1. Cholecystitis, acute  K81.0 575.0   2. Cough  R05 786.2   3. Exposure to STD  Z20.2 V01.6         Disposition:   Disposition: Transferred  Condition: Fair                        Binh Gonzalez MD  06/17/20 0342

## 2020-06-17 NOTE — ED NOTES
Patient agreed to the COVID 19 test after being briefed on the POC and that  no imaging test could be performed without it. Pt agreed to perform test and acting immature/playful during test. Pt briefed on severity of being situation and disease.Pt verbally agreed.

## 2020-06-18 LAB
C TRACH DNA SPEC QL NAA+PROBE: NOT DETECTED
N GONORRHOEA DNA SPEC QL NAA+PROBE: DETECTED

## 2020-06-30 ENCOUNTER — HOSPITAL ENCOUNTER (EMERGENCY)
Facility: HOSPITAL | Age: 35
Discharge: HOME | End: 2020-06-30
Attending: EMERGENCY MEDICINE
Payer: MEDICAID

## 2020-06-30 VITALS
HEIGHT: 67 IN | TEMPERATURE: 98 F | RESPIRATION RATE: 20 BRPM | DIASTOLIC BLOOD PRESSURE: 67 MMHG | SYSTOLIC BLOOD PRESSURE: 122 MMHG | HEART RATE: 92 BPM | WEIGHT: 213.88 LBS | BODY MASS INDEX: 33.57 KG/M2 | OXYGEN SATURATION: 100 %

## 2020-06-30 DIAGNOSIS — N61.1 BREAST ABSCESS: Primary | ICD-10-CM

## 2020-06-30 PROCEDURE — 99283 EMERGENCY DEPT VISIT LOW MDM: CPT | Mod: 25,ER

## 2020-06-30 PROCEDURE — 10061 I&D ABSCESS COMP/MULTIPLE: CPT | Mod: ER

## 2020-06-30 PROCEDURE — 25000003 PHARM REV CODE 250: Mod: ER | Performed by: EMERGENCY MEDICINE

## 2020-06-30 RX ORDER — LIDOCAINE HYDROCHLORIDE 10 MG/ML
5 INJECTION, SOLUTION EPIDURAL; INFILTRATION; INTRACAUDAL; PERINEURAL
Status: COMPLETED | OUTPATIENT
Start: 2020-06-30 | End: 2020-06-30

## 2020-06-30 RX ORDER — ONDANSETRON 4 MG/1
4 TABLET, ORALLY DISINTEGRATING ORAL EVERY 8 HOURS PRN
COMMUNITY
Start: 2020-06-23 | End: 2020-06-30

## 2020-06-30 RX ORDER — OXYCODONE HYDROCHLORIDE 5 MG/1
5 TABLET ORAL EVERY 4 HOURS PRN
COMMUNITY
Start: 2020-06-23 | End: 2020-06-30

## 2020-06-30 RX ORDER — ACETAMINOPHEN 500 MG
1000 TABLET ORAL
Status: COMPLETED | OUTPATIENT
Start: 2020-06-30 | End: 2020-06-30

## 2020-06-30 RX ORDER — DOCUSATE SODIUM 100 MG/1
100 CAPSULE, LIQUID FILLED ORAL DAILY PRN
COMMUNITY
Start: 2020-06-23 | End: 2020-07-03

## 2020-06-30 RX ORDER — SULFAMETHOXAZOLE AND TRIMETHOPRIM 800; 160 MG/1; MG/1
1 TABLET ORAL 2 TIMES DAILY
Qty: 14 TABLET | Refills: 0 | Status: SHIPPED | OUTPATIENT
Start: 2020-06-30 | End: 2020-07-07

## 2020-06-30 RX ADMIN — LIDOCAINE HYDROCHLORIDE 50 MG: 10 INJECTION, SOLUTION EPIDURAL; INFILTRATION; INTRACAUDAL; PERINEURAL at 07:06

## 2020-06-30 RX ADMIN — ACETAMINOPHEN 1000 MG: 500 TABLET ORAL at 08:06

## 2020-06-30 NOTE — ED PROVIDER NOTES
Encounter Date: 2020       History     Chief Complaint   Patient presents with    Breast Mass     PT c/o bilat breast masses. Pain to right breast only.     Patient is a 35-year-old female who presents today with complaints of bilateral breast abscesses.  Patient states she has a history of prior breast abscesses.  Patient reports 1 breast abscess underneath her right breast and to abscesses underneath left.  They have been present for about 1 week.  She reports 1 left breast abscess has had spontaneous purulent drainage and the other to have.  No prior treatment this most recent episode.  Prior evaluation this most recent episode.  Patient denies any known drug allergies.  History of tubal ligation        Review of patient's allergies indicates:  No Known Allergies  Past Medical History:   Diagnosis Date    Bipolar disorder      Past Surgical History:   Procedure Laterality Date     SECTION      CHOLECYSTECTOMY      TUBAL LIGATION       History reviewed. No pertinent family history.  Social History     Tobacco Use    Smoking status: Current Some Day Smoker     Types: Cigarettes    Smokeless tobacco: Never Used   Substance Use Topics    Alcohol use: Yes     Comment: occasionally     Drug use: Yes     Review of Systems   Constitutional: Negative for chills, diaphoresis and fever.   HENT: Negative for congestion, rhinorrhea and sore throat.    Eyes: Negative for pain, redness and visual disturbance.   Respiratory: Negative for cough and shortness of breath.    Cardiovascular: Negative for chest pain, palpitations and leg swelling.   Gastrointestinal: Negative for abdominal distention, abdominal pain, blood in stool, constipation, diarrhea, nausea and vomiting.   Genitourinary: Negative for dysuria and hematuria.   Musculoskeletal: Negative for arthralgias and joint swelling.   Skin:        Bilateral breast abscesses   Neurological: Negative for seizures, syncope and headaches.   All other systems  reviewed and are negative.      Physical Exam     Initial Vitals [20 0652]   BP Pulse Resp Temp SpO2   122/67 92 20 98.2 °F (36.8 °C) 100 %      MAP       --         Physical Exam    Nursing note and vitals reviewed.  Constitutional: She appears well-developed and well-nourished. No distress.   HENT:   Head: Normocephalic and atraumatic.   Mouth/Throat: Oropharynx is clear and moist.   Eyes: Conjunctivae and EOM are normal. Pupils are equal, round, and reactive to light.   Neck: Neck supple. No tracheal deviation present.   Cardiovascular: Normal rate, regular rhythm, normal heart sounds and intact distal pulses.   Pulmonary/Chest: Breath sounds normal. No respiratory distress.   Abdominal: Soft. She exhibits no distension. There is no abdominal tenderness. There is no rebound and no guarding.   Musculoskeletal: Normal range of motion. No tenderness or edema.   Neurological: She is alert and oriented to person, place, and time.   No focal deficits   Skin:   1 right breast abscess.  2 left breast abscesses   Psychiatric: She has a normal mood and affect. Her behavior is normal.         ED Course   I & D - Incision and Drainage    Date/Time: 2020 8:23 AM  Performed by: Zack Eisenberg MD  Authorized by: Zack Eisenberg MD   Consent Done: Yes  Consent: Verbal consent obtained.  Risks and benefits: risks, benefits and alternatives were discussed  Consent given by: patient  Patient understanding: patient states understanding of the procedure being performed  Patient consent: the patient's understanding of the procedure matches consent given  Required items: required blood products, implants, devices, and special equipment available  Patient identity confirmed: , name, MRN and verbally with patient  Type: abscess  Location: right breast.  Anesthesia: local infiltration    Anesthesia:  Local Anesthetic: lidocaine 1% without epinephrine  Anesthetic total (ml): 2.  Scalpel size: 11  Incision type: single  straight  Complexity: simple  Drainage: pus  Drainage amount: copious  Wound treatment: wound left open  Complications: No  Patient tolerance: Patient tolerated the procedure well with no immediate complications    I & D - Incision and Drainage    Date/Time: 2020 8:24 AM  Performed by: Zack Eisenberg MD  Authorized by: Zack Eisenberg MD   Consent Done: Yes  Consent: Verbal consent obtained.  Risks and benefits: risks, benefits and alternatives were discussed  Consent given by: patient  Patient understanding: patient states understanding of the procedure being performed  Patient consent: the patient's understanding of the procedure matches consent given  Required items: required blood products, implants, devices, and special equipment available  Patient identity confirmed: , name, verbally with patient and MRN  Type: abscess  Location: left breast abscess #1.  Anesthesia: local infiltration    Anesthesia:  Local Anesthetic: lidocaine 1% without epinephrine  Anesthetic total: 1 mL  Scalpel size: 11  Incision type: single straight  Complexity: simple  Drainage: pus  Drainage amount: copious  Wound treatment: wound left open  Complications: No  Patient tolerance: Patient tolerated the procedure well with no immediate complications    I & D - Incision and Drainage    Date/Time: 2020 8:24 AM  Performed by: Zack Eisenberg MD  Authorized by: Zack Eisenberg MD   Consent Done: Yes  Consent: Verbal consent obtained.  Risks and benefits: risks, benefits and alternatives were discussed  Patient understanding: patient states understanding of the procedure being performed  Patient consent: the patient's understanding of the procedure matches consent given  Required items: required blood products, implants, devices, and special equipment available  Patient identity confirmed: , name, verbally with patient and MRN  Type: abscess  Location: left breast abscess #2.  Scalpel size: 11  Incision type: single  straight  Complexity: simple  Drainage: pus  Drainage amount: moderate  Complications: No  Patient tolerance: Patient tolerated the procedure well with no immediate complications        Medications   lidocaine (PF) 10 mg/ml (1%) injection 50 mg (50 mg Infiltration Given 6/30/20 0723)   acetaminophen tablet 1,000 mg (1,000 mg Oral Given 6/30/20 0811)          Medical Decision Making:   I%D performed on 3 breast abscesses. Discharged home on bactrim                                  Clinical Impression:   Final diagnoses:  [N61.1] Breast abscess (Primary)          ED Disposition Condition    Discharge Stable        ED Prescriptions     Medication Sig Dispense Start Date End Date Auth. Provider    sulfamethoxazole-trimethoprim 800-160mg (BACTRIM DS) 800-160 mg Tab Take 1 tablet by mouth 2 (two) times daily. for 7 days 14 tablet 6/30/2020 7/7/2020 Zack Eisenberg MD        Follow-up Information     Follow up With Specialties Details Why Contact Info    Follow-up with primary care physician  In 2 days      [facility] Emergency Medicine  As needed, If symptoms worsen [id number]  [facility] [id number] [phone]                                     Zack Eisenberg MD  06/30/20 1849

## 2020-08-01 ENCOUNTER — HOSPITAL ENCOUNTER (EMERGENCY)
Facility: HOSPITAL | Age: 35
Discharge: HOME | End: 2020-08-01
Attending: EMERGENCY MEDICINE
Payer: MEDICAID

## 2020-08-01 VITALS
OXYGEN SATURATION: 97 % | TEMPERATURE: 98 F | SYSTOLIC BLOOD PRESSURE: 117 MMHG | WEIGHT: 220.44 LBS | RESPIRATION RATE: 16 BRPM | HEART RATE: 81 BPM | DIASTOLIC BLOOD PRESSURE: 71 MMHG | BODY MASS INDEX: 34.53 KG/M2

## 2020-08-01 DIAGNOSIS — E13.9 OTHER SPECIFIED DIABETES MELLITUS WITHOUT COMPLICATION, WITHOUT LONG-TERM CURRENT USE OF INSULIN: ICD-10-CM

## 2020-08-01 DIAGNOSIS — J69.0 ASPIRATION PNEUMONIA, UNSPECIFIED ASPIRATION PNEUMONIA TYPE, UNSPECIFIED LATERALITY, UNSPECIFIED PART OF LUNG: Primary | ICD-10-CM

## 2020-08-01 DIAGNOSIS — N39.0 URINARY TRACT INFECTION WITH HEMATURIA, SITE UNSPECIFIED: ICD-10-CM

## 2020-08-01 DIAGNOSIS — T40.601A OPIATE OVERDOSE: ICD-10-CM

## 2020-08-01 DIAGNOSIS — R31.9 URINARY TRACT INFECTION WITH HEMATURIA, SITE UNSPECIFIED: ICD-10-CM

## 2020-08-01 LAB
ALBUMIN SERPL BCP-MCNC: 3.4 G/DL (ref 3.5–5.2)
ALP SERPL-CCNC: 66 U/L (ref 55–135)
ALT SERPL W/O P-5'-P-CCNC: 30 U/L (ref 10–44)
AMORPH CRY UR QL COMP ASSIST: ABNORMAL
AMPHET+METHAMPHET UR QL: NEGATIVE
ANION GAP SERPL CALC-SCNC: 15 MMOL/L (ref 8–16)
AST SERPL-CCNC: 43 U/L (ref 10–40)
B-HCG UR QL: NEGATIVE
B-OH-BUTYR BLD STRIP-SCNC: 0.1 MMOL/L (ref 0–0.5)
BACTERIA #/AREA URNS AUTO: ABNORMAL /HPF
BARBITURATES UR QL SCN>200 NG/ML: NEGATIVE
BASOPHILS # BLD AUTO: 0.03 K/UL (ref 0–0.2)
BASOPHILS NFR BLD: 0.2 % (ref 0–1.9)
BENZODIAZ UR QL SCN>200 NG/ML: NEGATIVE
BILIRUB SERPL-MCNC: 0.2 MG/DL (ref 0.1–1)
BILIRUB UR QL STRIP: NEGATIVE
BUN SERPL-MCNC: 10 MG/DL (ref 6–20)
BZE UR QL SCN: NEGATIVE
CA PHOS CRY UR QL COMP ASSIST: ABNORMAL
CALCIUM SERPL-MCNC: 8.6 MG/DL (ref 8.7–10.5)
CANNABINOIDS UR QL SCN: NEGATIVE
CAOX CRY UR QL COMP ASSIST: ABNORMAL
CHLORIDE SERPL-SCNC: 101 MMOL/L (ref 95–110)
CLARITY UR REFRACT.AUTO: ABNORMAL
CO2 SERPL-SCNC: 21 MMOL/L (ref 23–29)
COLOR UR AUTO: YELLOW
CREAT SERPL-MCNC: 1.2 MG/DL (ref 0.5–1.4)
CREAT UR-MCNC: 67.8 MG/DL (ref 15–325)
DIFFERENTIAL METHOD: ABNORMAL
EOSINOPHIL # BLD AUTO: 0.1 K/UL (ref 0–0.5)
EOSINOPHIL NFR BLD: 0.7 % (ref 0–8)
EPITH CASTS #/AREA UR COMP ASSIST: 0 /LPF
ERYTHROCYTE [DISTWIDTH] IN BLOOD BY AUTOMATED COUNT: 15.8 % (ref 11.5–14.5)
EST. GFR  (AFRICAN AMERICAN): >60 ML/MIN/1.73 M^2
EST. GFR  (NON AFRICAN AMERICAN): 59.1 ML/MIN/1.73 M^2
ETHANOL SERPL-MCNC: <10 MG/DL
FATTY CASTS UR QL COMP ASSIST: 0 /LPF
GLUCOSE SERPL-MCNC: 346 MG/DL (ref 70–110)
GLUCOSE UR QL STRIP: ABNORMAL
GRAN CASTS UR QL COMP ASSIST: 0 /LPF
HCT VFR BLD AUTO: 34.6 % (ref 37–48.5)
HGB BLD-MCNC: 10.3 G/DL (ref 12–16)
HGB UR QL STRIP: ABNORMAL
HYALINE CASTS UR QL AUTO: 0 /LPF
IMM GRANULOCYTES # BLD AUTO: 0.18 K/UL (ref 0–0.04)
IMM GRANULOCYTES NFR BLD AUTO: 1 % (ref 0–0.5)
KETONES UR QL STRIP: NEGATIVE
LACTATE SERPL-SCNC: 3 MMOL/L (ref 0.5–2.2)
LEUKOCYTE ESTERASE UR QL STRIP: NEGATIVE
LYMPHOCYTES # BLD AUTO: 1.4 K/UL (ref 1–4.8)
LYMPHOCYTES NFR BLD: 7.5 % (ref 18–48)
MCH RBC QN AUTO: 26.7 PG (ref 27–31)
MCHC RBC AUTO-ENTMCNC: 29.8 G/DL (ref 32–36)
MCV RBC AUTO: 90 FL (ref 82–98)
METHADONE UR QL SCN>300 NG/ML: NEGATIVE
MICROSCOPIC COMMENT: ABNORMAL
MONOCYTES # BLD AUTO: 0.4 K/UL (ref 0.3–1)
MONOCYTES NFR BLD: 2.3 % (ref 4–15)
NEUTROPHILS # BLD AUTO: 16.3 K/UL (ref 1.8–7.7)
NEUTROPHILS NFR BLD: 88.3 % (ref 38–73)
NITRITE UR QL STRIP: POSITIVE
NON-SQ EPI CELLS #/AREA URNS AUTO: 0 /HPF
NRBC BLD-RTO: 0 /100 WBC
OPIATES UR QL SCN: NEGATIVE
PCP UR QL SCN>25 NG/ML: NEGATIVE
PH UR STRIP: 6 [PH] (ref 5–8)
PLATELET # BLD AUTO: 249 K/UL (ref 150–350)
PMV BLD AUTO: 9.6 FL (ref 9.2–12.9)
POCT GLUCOSE: 154 MG/DL (ref 70–110)
POTASSIUM SERPL-SCNC: 4.2 MMOL/L (ref 3.5–5.1)
PROT SERPL-MCNC: 6.6 G/DL (ref 6–8.4)
PROT UR QL STRIP: ABNORMAL
RBC # BLD AUTO: 3.86 M/UL (ref 4–5.4)
RBC #/AREA URNS AUTO: 6 /HPF (ref 0–4)
RBC CASTS UR QL COMP ASSIST: 0 /LPF
SARS-COV-2 RDRP RESP QL NAA+PROBE: NEGATIVE
SODIUM SERPL-SCNC: 137 MMOL/L (ref 136–145)
SP GR UR STRIP: 1.02 (ref 1–1.03)
SQUAMOUS #/AREA URNS AUTO: 4 /HPF
TOXICOLOGY INFORMATION: NORMAL
TRI-PHOS CRY UR QL COMP ASSIST: ABNORMAL
URN SPEC COLLECT METH UR: ABNORMAL
UROBILINOGEN UR STRIP-ACNC: NEGATIVE EU/DL
WAXY CASTS UR QL COMP ASSIST: 0 /LPF
WBC # BLD AUTO: 18.43 K/UL (ref 3.9–12.7)
WBC #/AREA URNS AUTO: 5 /HPF (ref 0–5)
WBC CASTS UR QL COMP ASSIST: 0 /LPF
WBC CLUMPS UR QL AUTO: ABNORMAL
YEAST UR QL AUTO: ABNORMAL

## 2020-08-01 PROCEDURE — 96361 HYDRATE IV INFUSION ADD-ON: CPT | Mod: ER

## 2020-08-01 PROCEDURE — 80053 COMPREHEN METABOLIC PANEL: CPT | Mod: ER

## 2020-08-01 PROCEDURE — 96365 THER/PROPH/DIAG IV INF INIT: CPT | Mod: ER

## 2020-08-01 PROCEDURE — 93005 ELECTROCARDIOGRAM TRACING: CPT | Mod: ER

## 2020-08-01 PROCEDURE — 63600175 PHARM REV CODE 636 W HCPCS: Mod: ER | Performed by: EMERGENCY MEDICINE

## 2020-08-01 PROCEDURE — 96375 TX/PRO/DX INJ NEW DRUG ADDON: CPT | Mod: ER

## 2020-08-01 PROCEDURE — 27000221 HC OXYGEN, UP TO 24 HOURS: Mod: ER

## 2020-08-01 PROCEDURE — 81000 URINALYSIS NONAUTO W/SCOPE: CPT | Mod: ER,59

## 2020-08-01 PROCEDURE — 96366 THER/PROPH/DIAG IV INF ADDON: CPT | Mod: ER

## 2020-08-01 PROCEDURE — 81025 URINE PREGNANCY TEST: CPT | Mod: ER

## 2020-08-01 PROCEDURE — 25000003 PHARM REV CODE 250: Mod: ER | Performed by: EMERGENCY MEDICINE

## 2020-08-01 PROCEDURE — 80320 DRUG SCREEN QUANTALCOHOLS: CPT | Mod: ER

## 2020-08-01 PROCEDURE — 93010 ELECTROCARDIOGRAM REPORT: CPT | Mod: ,,, | Performed by: INTERNAL MEDICINE

## 2020-08-01 PROCEDURE — U0002 COVID-19 LAB TEST NON-CDC: HCPCS | Mod: ER

## 2020-08-01 PROCEDURE — 83605 ASSAY OF LACTIC ACID: CPT | Mod: ER

## 2020-08-01 PROCEDURE — 96367 TX/PROPH/DG ADDL SEQ IV INF: CPT | Mod: ER

## 2020-08-01 PROCEDURE — 80307 DRUG TEST PRSMV CHEM ANLYZR: CPT | Mod: ER

## 2020-08-01 PROCEDURE — 82010 KETONE BODYS QUAN: CPT | Mod: ER

## 2020-08-01 PROCEDURE — 87040 BLOOD CULTURE FOR BACTERIA: CPT

## 2020-08-01 PROCEDURE — 85025 COMPLETE CBC W/AUTO DIFF WBC: CPT | Mod: ER

## 2020-08-01 PROCEDURE — 93010 EKG 12-LEAD: ICD-10-PCS | Mod: ,,, | Performed by: INTERNAL MEDICINE

## 2020-08-01 PROCEDURE — 99291 CRITICAL CARE FIRST HOUR: CPT | Mod: 25,ER

## 2020-08-01 PROCEDURE — S0030 INJECTION, METRONIDAZOLE: HCPCS | Mod: ER | Performed by: EMERGENCY MEDICINE

## 2020-08-01 RX ORDER — NALOXONE HYDROCHLORIDE 1 MG/ML
INJECTION INTRAMUSCULAR; INTRAVENOUS; SUBCUTANEOUS
Qty: 2 ML | Refills: 0 | Status: SHIPPED | OUTPATIENT
Start: 2020-08-01

## 2020-08-01 RX ORDER — METRONIDAZOLE 500 MG/100ML
500 INJECTION, SOLUTION INTRAVENOUS
Status: COMPLETED | OUTPATIENT
Start: 2020-08-01 | End: 2020-08-01

## 2020-08-01 RX ORDER — NALOXONE HCL 0.4 MG/ML
1 VIAL (ML) INJECTION
Status: COMPLETED | OUTPATIENT
Start: 2020-08-01 | End: 2020-08-01

## 2020-08-01 RX ORDER — METFORMIN HYDROCHLORIDE 500 MG/1
500 TABLET ORAL 2 TIMES DAILY WITH MEALS
Qty: 60 TABLET | Refills: 0 | Status: SHIPPED | OUTPATIENT
Start: 2020-08-01 | End: 2020-08-31

## 2020-08-01 RX ORDER — AMOXICILLIN AND CLAVULANATE POTASSIUM 875; 125 MG/1; MG/1
1 TABLET, FILM COATED ORAL 2 TIMES DAILY
Qty: 14 TABLET | Refills: 0 | Status: SHIPPED | OUTPATIENT
Start: 2020-08-01 | End: 2020-08-08

## 2020-08-01 RX ADMIN — METRONIDAZOLE 500 MG: 500 INJECTION, SOLUTION INTRAVENOUS at 07:08

## 2020-08-01 RX ADMIN — SODIUM CHLORIDE 1000 ML: 0.9 INJECTION, SOLUTION INTRAVENOUS at 06:08

## 2020-08-01 RX ADMIN — NALOXONE HYDROCHLORIDE 1 MG: 0.4 INJECTION, SOLUTION INTRAMUSCULAR; INTRAVENOUS; SUBCUTANEOUS at 05:08

## 2020-08-01 RX ADMIN — CEFTRIAXONE 1 G: 1 INJECTION, SOLUTION INTRAVENOUS at 06:08

## 2020-08-01 NOTE — ED PROVIDER NOTES
Encounter Date: 2020       History     Chief Complaint   Patient presents with    Drug Overdose     pt snorted heroin. pt breathing 4 times a minute when ems arrived. pt given 4 mg narcan. pt lethargic. ems reports cbg 450    Hyperglycemia     The history is provided by the patient and the EMS personnel.   Drug / Alcohol Assessment  Primary symptoms include somnolence, intoxication. This is a new problem. The current episode started just prior to arrival. The problem has been waxing and waning. Suspected agents include heroin. Pertinent negatives include no fever, no vomiting, no bladder incontinence and no bowel incontinence.   Pt reports insufflating heroin last night. Pt was breathing 4/m per EMS upon arrival. EMS gave a total of 4mg Narcan PTA.    Review of patient's allergies indicates:  No Known Allergies  Past Medical History:   Diagnosis Date    Bipolar disorder      Past Surgical History:   Procedure Laterality Date     SECTION      CHOLECYSTECTOMY      TUBAL LIGATION       No family history on file.  Social History     Tobacco Use    Smoking status: Current Some Day Smoker     Types: Cigarettes    Smokeless tobacco: Never Used   Substance Use Topics    Alcohol use: Yes     Comment: occasionally     Drug use: Yes     Review of Systems   Constitutional: Negative for fever.   Gastrointestinal: Negative for bowel incontinence and vomiting.   Genitourinary: Negative for bladder incontinence.   Neurological:        AMS   All other systems reviewed and are negative.      Physical Exam     Initial Vitals [20 0518]   BP Pulse Resp Temp SpO2   (!) 122/58 86 16 98.4 °F (36.9 °C) (!) 87 %      MAP       --         Physical Exam    Nursing note and vitals reviewed.  Constitutional: She appears well-developed and well-nourished. She appears distressed.   HENT:   Head: Normocephalic and atraumatic.   Mouth/Throat: Oropharynx is clear and moist.   Eyes: Conjunctivae and EOM are normal. Pupils  are equal, round, and reactive to light.   Neck: Normal range of motion. Neck supple.   Cardiovascular: Normal rate, regular rhythm and normal heart sounds.   Pulmonary/Chest: Breath sounds normal. No respiratory distress.   Abdominal: Soft. Bowel sounds are normal. She exhibits no distension. There is no abdominal tenderness.   Musculoskeletal: Normal range of motion.   Neurological: She is oriented to person, place, and time. She has normal strength.   Somnolent   Skin: Skin is warm and dry.   Psychiatric: She has a normal mood and affect. Thought content normal.         ED Course   Critical Care    Date/Time: 8/1/2020 7:47 AM  Performed by: Zack Eisenberg MD  Authorized by: Zack Eisenberg MD   Direct patient critical care time: 15 minutes  Additional history critical care time: 5 minutes  Ordering / reviewing critical care time: 10 minutes  Documentation critical care time: 5 minutes  Total critical care time (exclusive of procedural time) : 35 minutes  Critical care time was exclusive of separately billable procedures and treating other patients and teaching time.  Critical care was necessary to treat or prevent imminent or life-threatening deterioration of the following conditions: opioid overdose requiring narcan.  Critical care was time spent personally by me on the following activities: blood draw for specimens, development of treatment plan with patient or surrogate, interpretation of cardiac output measurements, evaluation of patient's response to treatment, examination of patient, obtaining history from patient or surrogate, ordering and performing treatments and interventions, ordering and review of laboratory studies, ordering and review of radiographic studies, pulse oximetry and re-evaluation of patient's condition.        Labs Reviewed   CBC W/ AUTO DIFFERENTIAL - Abnormal; Notable for the following components:       Result Value    WBC 18.43 (*)     RBC 3.86 (*)     Hemoglobin 10.3 (*)      Hematocrit 34.6 (*)     Mean Corpuscular Hemoglobin 26.7 (*)     Mean Corpuscular Hemoglobin Conc 29.8 (*)     RDW 15.8 (*)     Immature Granulocytes 1.0 (*)     Gran # (ANC) 16.3 (*)     Immature Grans (Abs) 0.18 (*)     Gran% 88.3 (*)     Lymph% 7.5 (*)     Mono% 2.3 (*)     All other components within normal limits   COMPREHENSIVE METABOLIC PANEL - Abnormal; Notable for the following components:    CO2 21 (*)     Glucose 346 (*)     Calcium 8.6 (*)     Albumin 3.4 (*)     AST 43 (*)     eGFR if non  59.1 (*)     All other components within normal limits   URINALYSIS, REFLEX TO URINE CULTURE - Abnormal; Notable for the following components:    Appearance, UA Hazy (*)     Protein, UA 1+ (*)     Glucose, UA 2+ (*)     Occult Blood UA 1+ (*)     Nitrite, UA Positive (*)     All other components within normal limits    Narrative:     Specimen Source->Urine   LACTIC ACID, PLASMA - Abnormal; Notable for the following components:    Lactate (Lactic Acid) 3.0 (*)     All other components within normal limits   URINALYSIS MICROSCOPIC - Abnormal; Notable for the following components:    RBC, UA 6 (*)     Bacteria Moderate (*)     All other components within normal limits    Narrative:     Specimen Source->Urine   CULTURE, BLOOD   CULTURE, BLOOD   BETA - HYDROXYBUTYRATE, SERUM   PREGNANCY TEST, URINE RAPID    Narrative:     Specimen Source->Urine   DRUG SCREEN PANEL, URINE EMERGENCY    Narrative:     Specimen Source->Urine   ALCOHOL,MEDICAL (ETHANOL)   SARS-COV-2 RNA AMPLIFICATION, QUAL     Results for orders placed or performed during the hospital encounter of 08/01/20   CBC auto differential   Result Value Ref Range    WBC 18.43 (H) 3.90 - 12.70 K/uL    RBC 3.86 (L) 4.00 - 5.40 M/uL    Hemoglobin 10.3 (L) 12.0 - 16.0 g/dL    Hematocrit 34.6 (L) 37.0 - 48.5 %    Mean Corpuscular Volume 90 82 - 98 fL    Mean Corpuscular Hemoglobin 26.7 (L) 27.0 - 31.0 pg    Mean Corpuscular Hemoglobin Conc 29.8 (L) 32.0 -  36.0 g/dL    RDW 15.8 (H) 11.5 - 14.5 %    Platelets 249 150 - 350 K/uL    MPV 9.6 9.2 - 12.9 fL    Immature Granulocytes 1.0 (H) 0.0 - 0.5 %    Gran # (ANC) 16.3 (H) 1.8 - 7.7 K/uL    Immature Grans (Abs) 0.18 (H) 0.00 - 0.04 K/uL    Lymph # 1.4 1.0 - 4.8 K/uL    Mono # 0.4 0.3 - 1.0 K/uL    Eos # 0.1 0.0 - 0.5 K/uL    Baso # 0.03 0.00 - 0.20 K/uL    nRBC 0 0 /100 WBC    Gran% 88.3 (H) 38.0 - 73.0 %    Lymph% 7.5 (L) 18.0 - 48.0 %    Mono% 2.3 (L) 4.0 - 15.0 %    Eosinophil% 0.7 0.0 - 8.0 %    Basophil% 0.2 0.0 - 1.9 %    Differential Method Automated    Comprehensive metabolic panel   Result Value Ref Range    Sodium 137 136 - 145 mmol/L    Potassium 4.2 3.5 - 5.1 mmol/L    Chloride 101 95 - 110 mmol/L    CO2 21 (L) 23 - 29 mmol/L    Glucose 346 (H) 70 - 110 mg/dL    BUN, Bld 10 6 - 20 mg/dL    Creatinine 1.2 0.5 - 1.4 mg/dL    Calcium 8.6 (L) 8.7 - 10.5 mg/dL    Total Protein 6.6 6.0 - 8.4 g/dL    Albumin 3.4 (L) 3.5 - 5.2 g/dL    Total Bilirubin 0.2 0.1 - 1.0 mg/dL    Alkaline Phosphatase 66 55 - 135 U/L    AST 43 (H) 10 - 40 U/L    ALT 30 10 - 44 U/L    Anion Gap 15 8 - 16 mmol/L    eGFR if African American >60.0 >60 mL/min/1.73 m^2    eGFR if non  59.1 (A) >60 mL/min/1.73 m^2   Beta - Hydroxybutyrate, Serum   Result Value Ref Range    Beta-Hydroxybutyrate 0.1 0.0 - 0.5 mmol/L   Urinalysis, Reflex to Urine Culture Urine, Clean Catch    Specimen: Urine   Result Value Ref Range    Specimen UA Urine, Clean Catch     Color, UA Yellow Yellow, Straw, Charlee    Appearance, UA Hazy (A) Clear    pH, UA 6.0 5.0 - 8.0    Specific Gravity, UA 1.025 1.005 - 1.030    Protein, UA 1+ (A) Negative    Glucose, UA 2+ (A) Negative    Ketones, UA Negative Negative    Bilirubin (UA) Negative Negative    Occult Blood UA 1+ (A) Negative    Nitrite, UA Positive (A) Negative    Urobilinogen, UA Negative <2.0 EU/dL    Leukocytes, UA Negative Negative   Pregnancy, urine rapid   Result Value Ref Range    Preg Test, Ur  Negative    Drug screen panel, emergency   Result Value Ref Range    Benzodiazepines Negative     Methadone metabolites Negative     Cocaine (Metab.) Negative     Opiate Scrn, Ur Negative     Barbiturate Screen, Ur Negative     Amphetamine Screen, Ur Negative     THC Negative     Phencyclidine Negative     Creatinine, Random Ur 67.8 15.0 - 325.0 mg/dL    Toxicology Information SEE COMMENT    Ethanol   Result Value Ref Range    Alcohol, Medical, Serum <10 <10 mg/dL   COVID-19 Rapid Screening   Result Value Ref Range    SARS-CoV-2 RNA, Amplification, Qual Negative Negative   Lactic Acid, Plasma   Result Value Ref Range    Lactate (Lactic Acid) 3.0 (H) 0.5 - 2.2 mmol/L   Urinalysis Microscopic   Result Value Ref Range    RBC, UA 6 (H) 0 - 4 /hpf    WBC, UA 5 0 - 5 /hpf    WBC Clumps, UA None None-Rare    Bacteria Moderate (A) None-Occ /hpf    Yeast, UA None None    Squam Epithel, UA 4 /hpf    Non-Squam Epith 0 <1/hpf /hpf    Hyaline Casts, UA 0 0-1/lpf /lpf    Epithelial Casts, UA 0 None /lpf    Wbc Casts, Ua 0 None /lpf    RBC Casts, UA 0 None /lpf    Granular Casts, UA 0 None /lpf    Fatty Casts, UA 0 None /lpf    Waxy Casts, UA 0 None /lpf    Triplephos Galina, UA None None-Moderate    Ca Oxalate Gal[place], UA None None-Moderate    Ca Phos Gal[place], UA None None-Moderate    Amorphous, UA Moderate None-Moderate    Microscopic Comment SEE COMMENT        EKG Readings: (Independently Interpreted)   Initial Reading: No STEMI. Rhythm: Normal Sinus Rhythm. Heart Rate: 84. ST Segments: Normal ST Segments. T Waves: Normal. Axis: Normal. Clinical Impression: Normal Sinus Rhythm     ECG Results          EKG 12-lead (In process)  Result time 08/01/20 07:20:17    In process by Interface, Lab In [facility] (08/01/20 07:20:17)                 Narrative:    Test Reason : T40.601A,    Vent. Rate : 084 BPM     Atrial Rate : 084 BPM     P-R Int : 182 ms          QRS Dur : 084 ms      QT Int : 404 ms       P-R-T Axes : 052 008 024 degrees     QTc  Int : 477 ms    Normal sinus rhythm  Normal ECG  When compared with ECG of 08-JUN-2020 18:44,  Premature ventricular complexes are no longer Present    Referred By: [email or web address]   SELF           Confirmed By:                             Imaging Results          X-Ray Chest 1 View (Preliminary result)  Result time 08/01/20 05:53:01    ED Interpretation by Binh Gonzalez MD (08/01/20 05:53:01, [facility], Emergency Medicine)    Left lower lobe infiltrate/ Right basilar patchy inf                          5:44 AM Pt given Narcan 1mg upon arrival to ED as she became sonorous and POx fell 79%. Blood Glucose greater than 500.    Vitals:    08/01/20 0530 08/01/20 0545 08/01/20 0600 08/01/20 0717   BP: 112/65 105/70 111/65 128/67   Pulse: 87 85 82 79   Resp: (!) 23 (!) 22 16 15   Temp:       TempSrc:       SpO2: (!) 92% 97% 99% 95%   Weight:              Medical Decision Making:   Opioid overdose requiring Narcan prior to arrival as well as Narcan here in the emergency.  Suspect aspiration pneumonia.  Patient treated with Rocephin and Flagyl here in the emergency department.  Hypoxia on arrival was due to opioids and not aspiration pneumonia.  Hypoxia resolved after Narcan administration.  After narcan, Patient has been off of oxygen for 2 hr and has not been hypoxic.  On multiple reexaminations, patient has exhibited no residual effects of opioid toxicity after Narcan.  She has a mild cough, but no increased work breathing or hypoxia. Initial lactic <4. Patient is stable for a trial of outpatient management for her aspiration PNA.  Augmentin provided. Augmentin to cover UTI as well.  Patient informed of her new diagnosis of diabetes.  Of note, patient does have a history of gestational diabetes. Glucose at time of discharge 150. Patient started on metformin.  Educated about diabetic diet.  Advised to follow up with primary care physician in 2 days to discuss new onset diabetes and to follow-up for  her pneumonia.  Naloxone provided at discharge.  Counseled against opioid abuse.  Patient discharged home in stable condition                                   Clinical Impression:   Final diagnoses:  [T40.601A] Opiate overdose  [J69.0] Aspiration pneumonia, unspecified aspiration pneumonia type, unspecified laterality, unspecified part of lung (Primary)  [N39.0, R31.9] Urinary tract infection with hematuria, site unspecified  [E13.9] Other specified diabetes mellitus without complication, without long-term current use of insulin          ED Disposition Condition    Discharge Stable        ED Prescriptions     Medication Sig Dispense Start Date End Date Auth. Provider    amoxicillin-clavulanate 875-125mg (AUGMENTIN) 875-125 mg per tablet Take 1 tablet by mouth 2 (two) times daily. for 7 days 14 tablet 8/1/2020 8/8/2020 Zack Eisenberg MD    metFORMIN (GLUCOPHAGE) 500 MG tablet Take 1 tablet (500 mg total) by mouth 2 (two) times daily with meals. 60 tablet 8/1/2020 8/31/2020 Zack Eisenberg MD    naloxone (NARCAN) 1 mg/mL injection 2 mg (1 mg per nostril) by Nasal route as needed for opioid overdose; may repeat in 3 to 5 minutes if not effective. Call 911 2 mL 8/1/2020  Zack Eisenberg MD        Follow-up Information     Follow up With Specialties Details Why Contact Info    Follow-up with primary care physician  In 2 days      [facility] Emergency Medicine  As needed, If symptoms worsen [id number]  [facility] [id number] [phone]                                     Zack Eisenberg MD  08/01/20 1206

## 2020-08-06 LAB
BACTERIA BLD CULT: NORMAL
BACTERIA BLD CULT: NORMAL

## 2020-08-18 ENCOUNTER — HOSPITAL ENCOUNTER (EMERGENCY)
Facility: HOSPITAL | Age: 35
Discharge: HOME | End: 2020-08-18
Attending: EMERGENCY MEDICINE
Payer: MEDICAID

## 2020-08-18 VITALS
SYSTOLIC BLOOD PRESSURE: 129 MMHG | WEIGHT: 208.75 LBS | TEMPERATURE: 98 F | DIASTOLIC BLOOD PRESSURE: 74 MMHG | HEIGHT: 68 IN | BODY MASS INDEX: 31.64 KG/M2 | OXYGEN SATURATION: 100 % | HEART RATE: 69 BPM | RESPIRATION RATE: 16 BRPM

## 2020-08-18 DIAGNOSIS — F14.10 COCAINE ABUSE: ICD-10-CM

## 2020-08-18 DIAGNOSIS — Z00.8 MEDICAL CLEARANCE FOR PSYCHIATRIC ADMISSION: ICD-10-CM

## 2020-08-18 DIAGNOSIS — F32.A DEPRESSION, UNSPECIFIED DEPRESSION TYPE: Primary | ICD-10-CM

## 2020-08-18 LAB
ALBUMIN SERPL BCP-MCNC: 3.5 G/DL (ref 3.5–5.2)
ALP SERPL-CCNC: 67 U/L (ref 55–135)
ALT SERPL W/O P-5'-P-CCNC: 14 U/L (ref 10–44)
AMPHET+METHAMPHET UR QL: NEGATIVE
ANION GAP SERPL CALC-SCNC: 10 MMOL/L (ref 8–16)
APAP SERPL-MCNC: <3 UG/ML (ref 10–20)
AST SERPL-CCNC: 12 U/L (ref 10–40)
BARBITURATES UR QL SCN>200 NG/ML: NEGATIVE
BASOPHILS # BLD AUTO: 0.01 K/UL (ref 0–0.2)
BASOPHILS NFR BLD: 0.2 % (ref 0–1.9)
BENZODIAZ UR QL SCN>200 NG/ML: NEGATIVE
BILIRUB SERPL-MCNC: 0.2 MG/DL (ref 0.1–1)
BILIRUB UR QL STRIP: NEGATIVE
BUN SERPL-MCNC: 11 MG/DL (ref 6–20)
BZE UR QL SCN: NORMAL
CALCIUM SERPL-MCNC: 8.8 MG/DL (ref 8.7–10.5)
CANNABINOIDS UR QL SCN: NORMAL
CHLORIDE SERPL-SCNC: 107 MMOL/L (ref 95–110)
CLARITY UR REFRACT.AUTO: CLEAR
CO2 SERPL-SCNC: 24 MMOL/L (ref 23–29)
COLOR UR AUTO: YELLOW
CREAT SERPL-MCNC: 0.8 MG/DL (ref 0.5–1.4)
CREAT UR-MCNC: 235.7 MG/DL (ref 15–325)
DIFFERENTIAL METHOD: ABNORMAL
EOSINOPHIL # BLD AUTO: 0.1 K/UL (ref 0–0.5)
EOSINOPHIL NFR BLD: 1.7 % (ref 0–8)
ERYTHROCYTE [DISTWIDTH] IN BLOOD BY AUTOMATED COUNT: 14.9 % (ref 11.5–14.5)
EST. GFR  (AFRICAN AMERICAN): >60 ML/MIN/1.73 M^2
EST. GFR  (NON AFRICAN AMERICAN): >60 ML/MIN/1.73 M^2
ETHANOL SERPL-MCNC: <10 MG/DL
GLUCOSE SERPL-MCNC: 139 MG/DL (ref 70–110)
GLUCOSE UR QL STRIP: NEGATIVE
HCT VFR BLD AUTO: 35.9 % (ref 37–48.5)
HGB BLD-MCNC: 11.1 G/DL (ref 12–16)
HGB UR QL STRIP: NEGATIVE
IMM GRANULOCYTES # BLD AUTO: 0.02 K/UL (ref 0–0.04)
IMM GRANULOCYTES NFR BLD AUTO: 0.3 % (ref 0–0.5)
KETONES UR QL STRIP: NEGATIVE
LEUKOCYTE ESTERASE UR QL STRIP: NEGATIVE
LYMPHOCYTES # BLD AUTO: 2.3 K/UL (ref 1–4.8)
LYMPHOCYTES NFR BLD: 34.6 % (ref 18–48)
MCH RBC QN AUTO: 26.1 PG (ref 27–31)
MCHC RBC AUTO-ENTMCNC: 30.9 G/DL (ref 32–36)
MCV RBC AUTO: 84 FL (ref 82–98)
METHADONE UR QL SCN>300 NG/ML: NEGATIVE
MONOCYTES # BLD AUTO: 0.4 K/UL (ref 0.3–1)
MONOCYTES NFR BLD: 6.8 % (ref 4–15)
NEUTROPHILS # BLD AUTO: 3.7 K/UL (ref 1.8–7.7)
NEUTROPHILS NFR BLD: 56.4 % (ref 38–73)
NITRITE UR QL STRIP: NEGATIVE
NRBC BLD-RTO: 0 /100 WBC
OPIATES UR QL SCN: NEGATIVE
PCP UR QL SCN>25 NG/ML: NEGATIVE
PH UR STRIP: 6 [PH] (ref 5–8)
PLATELET # BLD AUTO: 283 K/UL (ref 150–350)
PMV BLD AUTO: 9.8 FL (ref 9.2–12.9)
POTASSIUM SERPL-SCNC: 3.8 MMOL/L (ref 3.5–5.1)
PROT SERPL-MCNC: 7.1 G/DL (ref 6–8.4)
PROT UR QL STRIP: NEGATIVE
RBC # BLD AUTO: 4.26 M/UL (ref 4–5.4)
SARS-COV-2 RDRP RESP QL NAA+PROBE: NEGATIVE
SODIUM SERPL-SCNC: 141 MMOL/L (ref 136–145)
SP GR UR STRIP: >=1.03 (ref 1–1.03)
TOXICOLOGY INFORMATION: NORMAL
TSH SERPL DL<=0.005 MIU/L-ACNC: 0.99 UIU/ML (ref 0.4–4)
URN SPEC COLLECT METH UR: ABNORMAL
UROBILINOGEN UR STRIP-ACNC: NEGATIVE EU/DL
WBC # BLD AUTO: 6.51 K/UL (ref 3.9–12.7)

## 2020-08-18 PROCEDURE — 99283 EMERGENCY DEPT VISIT LOW MDM: CPT | Mod: ER

## 2020-08-18 PROCEDURE — 80307 DRUG TEST PRSMV CHEM ANLYZR: CPT | Mod: ER

## 2020-08-18 PROCEDURE — 81003 URINALYSIS AUTO W/O SCOPE: CPT | Mod: 59,ER

## 2020-08-18 PROCEDURE — 80053 COMPREHEN METABOLIC PANEL: CPT | Mod: ER

## 2020-08-18 PROCEDURE — 85025 COMPLETE CBC W/AUTO DIFF WBC: CPT | Mod: ER

## 2020-08-18 PROCEDURE — U0002 COVID-19 LAB TEST NON-CDC: HCPCS | Mod: ER

## 2020-08-18 PROCEDURE — 25000003 PHARM REV CODE 250: Mod: ER | Performed by: EMERGENCY MEDICINE

## 2020-08-18 PROCEDURE — S4991 NICOTINE PATCH NONLEGEND: HCPCS | Mod: ER | Performed by: EMERGENCY MEDICINE

## 2020-08-18 PROCEDURE — 80320 DRUG SCREEN QUANTALCOHOLS: CPT | Mod: ER

## 2020-08-18 PROCEDURE — 80329 ANALGESICS NON-OPIOID 1 OR 2: CPT | Mod: ER

## 2020-08-18 PROCEDURE — 84443 ASSAY THYROID STIM HORMONE: CPT | Mod: ER

## 2020-08-18 RX ORDER — IBUPROFEN 200 MG
1 TABLET ORAL
Status: DISCONTINUED | OUTPATIENT
Start: 2020-08-18 | End: 2020-08-18 | Stop reason: HOSPADM

## 2020-08-18 RX ADMIN — Medication 1 PATCH: at 02:08

## 2020-08-18 NOTE — ED NOTES
Patient drug hx updated, pt expressed her desire to get help for drug abuse. Patient has spoke with [facility] and has

## 2020-08-18 NOTE — ED PROVIDER NOTES
"Encounter Date: 2020       History     Chief Complaint   Patient presents with    Medical Clearance     Accepted at [facility], but needs medical clearance.      The history is provided by the patient.   Mental Health Problem  The primary symptoms include depressed mood and dysphoric mood. The current episode started several weeks ago. This is a recurrent problem.   The onset of the illness is precipitated by emotional stress. The degree of incapacity that she is experiencing as a consequence of her illness is moderate. She does not admit to suicidal ideas. She does not have a plan to attempt suicide. She does not contemplate harming herself. She has not already injured self. She does not contemplate injuring another person. She has not already  injured another person. Risk factors that are present for mental illness include a history of mental illness.     Review of patient's allergies indicates:  No Known Allergies  Past Medical History:   Diagnosis Date    Bipolar disorder     Schizophrenia      Past Surgical History:   Procedure Laterality Date     SECTION      CHOLECYSTECTOMY      TUBAL LIGATION       History reviewed. No pertinent family history.  Social History     Tobacco Use    Smoking status: Current Some Day Smoker     Types: Cigarettes    Smokeless tobacco: Never Used   Substance Use Topics    Alcohol use: Yes     Frequency: 4 or more times a week     Comment: occasionally     Drug use: Yes     Types: "Crack" cocaine, Codeine, Heroin, Fentanyl, Marijuana     Comment: heronin, fentanyl, crack cocaine, weed - last used this morning     Review of Systems   Psychiatric/Behavioral: Positive for dysphoric mood.   All other systems reviewed and are negative.      Physical Exam     Initial Vitals [20 1320]   BP Pulse Resp Temp SpO2   (!) 154/82 81 16 98.2 °F (36.8 °C) 100 %      MAP       --         Physical Exam    Nursing note and vitals reviewed.  Constitutional: She appears " well-developed and well-nourished. No distress.   HENT:   Head: Normocephalic and atraumatic.   Mouth/Throat: Oropharynx is clear and moist.   Eyes: Conjunctivae and EOM are normal. Pupils are equal, round, and reactive to light.   Neck: Normal range of motion. Neck supple.   Cardiovascular: Normal rate, regular rhythm and normal heart sounds.   Pulmonary/Chest: Breath sounds normal. No respiratory distress.   Abdominal: Soft. Bowel sounds are normal. She exhibits no distension. There is no abdominal tenderness.   Musculoskeletal: Normal range of motion.   Neurological: She is alert and oriented to person, place, and time. She has normal strength.   Skin: Skin is warm and dry.   Psychiatric: Thought content normal. Thought content is not paranoid and not delusional. She exhibits a depressed mood. She expresses no homicidal and no suicidal ideation. She expresses no suicidal plans and no homicidal plans.         ED Course   Procedures  Labs Reviewed   CBC W/ AUTO DIFFERENTIAL - Abnormal; Notable for the following components:       Result Value    Hemoglobin 11.1 (*)     Hematocrit 35.9 (*)     Mean Corpuscular Hemoglobin 26.1 (*)     Mean Corpuscular Hemoglobin Conc 30.9 (*)     RDW 14.9 (*)     All other components within normal limits   COMPREHENSIVE METABOLIC PANEL - Abnormal; Notable for the following components:    Glucose 139 (*)     All other components within normal limits   URINALYSIS, REFLEX TO URINE CULTURE - Abnormal; Notable for the following components:    Specific Gravity, UA >=1.030 (*)     All other components within normal limits    Narrative:     Specimen Source->Urine   ACETAMINOPHEN LEVEL - Abnormal; Notable for the following components:    Acetaminophen (Tylenol), Serum <3.0 (*)     All other components within normal limits   TSH   DRUG SCREEN PANEL, URINE EMERGENCY    Narrative:     Specimen Source->Urine   ALCOHOL,MEDICAL (ETHANOL)   SARS-COV-2 RNA AMPLIFICATION, QUAL     Results for orders  placed or performed during the hospital encounter of 08/18/20   CBC auto differential   Result Value Ref Range    WBC 6.51 3.90 - 12.70 K/uL    RBC 4.26 4.00 - 5.40 M/uL    Hemoglobin 11.1 (L) 12.0 - 16.0 g/dL    Hematocrit 35.9 (L) 37.0 - 48.5 %    Mean Corpuscular Volume 84 82 - 98 fL    Mean Corpuscular Hemoglobin 26.1 (L) 27.0 - 31.0 pg    Mean Corpuscular Hemoglobin Conc 30.9 (L) 32.0 - 36.0 g/dL    RDW 14.9 (H) 11.5 - 14.5 %    Platelets 283 150 - 350 K/uL    MPV 9.8 9.2 - 12.9 fL    Immature Granulocytes 0.3 0.0 - 0.5 %    Gran # (ANC) 3.7 1.8 - 7.7 K/uL    Immature Grans (Abs) 0.02 0.00 - 0.04 K/uL    Lymph # 2.3 1.0 - 4.8 K/uL    Mono # 0.4 0.3 - 1.0 K/uL    Eos # 0.1 0.0 - 0.5 K/uL    Baso # 0.01 0.00 - 0.20 K/uL    nRBC 0 0 /100 WBC    Gran% 56.4 38.0 - 73.0 %    Lymph% 34.6 18.0 - 48.0 %    Mono% 6.8 4.0 - 15.0 %    Eosinophil% 1.7 0.0 - 8.0 %    Basophil% 0.2 0.0 - 1.9 %    Differential Method Automated    Comprehensive metabolic panel   Result Value Ref Range    Sodium 141 136 - 145 mmol/L    Potassium 3.8 3.5 - 5.1 mmol/L    Chloride 107 95 - 110 mmol/L    CO2 24 23 - 29 mmol/L    Glucose 139 (H) 70 - 110 mg/dL    BUN, Bld 11 6 - 20 mg/dL    Creatinine 0.8 0.5 - 1.4 mg/dL    Calcium 8.8 8.7 - 10.5 mg/dL    Total Protein 7.1 6.0 - 8.4 g/dL    Albumin 3.5 3.5 - 5.2 g/dL    Total Bilirubin 0.2 0.1 - 1.0 mg/dL    Alkaline Phosphatase 67 55 - 135 U/L    AST 12 10 - 40 U/L    ALT 14 10 - 44 U/L    Anion Gap 10 8 - 16 mmol/L    eGFR if African American >60.0 >60 mL/min/1.73 m^2    eGFR if non African American >60.0 >60 mL/min/1.73 m^2   TSH   Result Value Ref Range    TSH 0.992 0.400 - 4.000 uIU/mL   Urinalysis, Reflex to Urine Culture Urine, Clean Catch    Specimen: Urine   Result Value Ref Range    Specimen UA Urine, Clean Catch     Color, UA Yellow Yellow, Straw, Charlee    Appearance, UA Clear Clear    pH, UA 6.0 5.0 - 8.0    Specific Gravity, UA >=1.030 (A) 1.005 - 1.030    Protein, UA Negative Negative     Glucose, UA Negative Negative    Ketones, UA Negative Negative    Bilirubin (UA) Negative Negative    Occult Blood UA Negative Negative    Nitrite, UA Negative Negative    Urobilinogen, UA Negative <2.0 EU/dL    Leukocytes, UA Negative Negative   Drug screen panel, emergency   Result Value Ref Range    Benzodiazepines Negative     Methadone metabolites Negative     Cocaine (Metab.) Presumptive Positive     Opiate Scrn, Ur Negative     Barbiturate Screen, Ur Negative     Amphetamine Screen, Ur Negative     THC Presumptive Positive     Phencyclidine Negative     Creatinine, Random Ur 235.7 15.0 - 325.0 mg/dL    Toxicology Information SEE COMMENT    Ethanol   Result Value Ref Range    Alcohol, Medical, Serum <10 <10 mg/dL   Acetaminophen level   Result Value Ref Range    Acetaminophen (Tylenol), Serum <3.0 (L) 10.0 - 20.0 ug/mL   COVID-19 Rapid Screening   Result Value Ref Range    SARS-CoV-2 RNA, Amplification, Qual Negative Negative            Imaging Results    None     3:14 PM - Counseling: Spoke with the patient and discussed todays findings, in addition to providing specific details for the plan of care and counseling regarding the diagnosis and prognosis. Questions are answered at this time.     Pt is medically cleared for psychiatric placement.                                     Clinical Impression:       ICD-10-CM ICD-9-CM   1. Depression, unspecified depression type  F32.9 311   2. Cocaine abuse  F14.10 305.60   3. Medical clearance for psychiatric admission  Z00.8 V70.8             ED Disposition Condition    Discharge Stable        ED Prescriptions     None        Follow-up Information    None                                    Binh Gonzalez MD  08/18/20 [id number]

## 2020-09-23 ENCOUNTER — HOSPITAL ENCOUNTER (EMERGENCY)
Facility: HOSPITAL | Age: 35
Discharge: PSYCHIATRIC OR SUBSTANCE USE FACILITY | End: 2020-09-24
Attending: EMERGENCY MEDICINE
Payer: MEDICAID

## 2020-09-23 VITALS
BODY MASS INDEX: 34.97 KG/M2 | OXYGEN SATURATION: 100 % | TEMPERATURE: 99 F | RESPIRATION RATE: 18 BRPM | SYSTOLIC BLOOD PRESSURE: 135 MMHG | DIASTOLIC BLOOD PRESSURE: 84 MMHG | WEIGHT: 230 LBS | HEART RATE: 80 BPM

## 2020-09-23 DIAGNOSIS — F19.10 POLYSUBSTANCE ABUSE: ICD-10-CM

## 2020-09-23 DIAGNOSIS — F29 PSYCHOSIS, UNSPECIFIED PSYCHOSIS TYPE: ICD-10-CM

## 2020-09-23 DIAGNOSIS — R44.0 AUDITORY HALLUCINATIONS: ICD-10-CM

## 2020-09-23 DIAGNOSIS — R07.9 CHEST PAIN: ICD-10-CM

## 2020-09-23 DIAGNOSIS — F31.2: Primary | ICD-10-CM

## 2020-09-23 DIAGNOSIS — Z91.148 NONCOMPLIANCE WITH MEDICATION REGIMEN: ICD-10-CM

## 2020-09-23 LAB
ALBUMIN SERPL BCP-MCNC: 3.9 G/DL (ref 3.5–5.2)
ALP SERPL-CCNC: 62 U/L (ref 55–135)
ALT SERPL W/O P-5'-P-CCNC: 20 U/L (ref 10–44)
AMPHET+METHAMPHET UR QL: NORMAL
ANION GAP SERPL CALC-SCNC: 13 MMOL/L (ref 8–16)
APAP SERPL-MCNC: <3 UG/ML (ref 10–20)
AST SERPL-CCNC: 26 U/L (ref 10–40)
BARBITURATES UR QL SCN>200 NG/ML: NEGATIVE
BASOPHILS # BLD AUTO: 0.02 K/UL (ref 0–0.2)
BASOPHILS NFR BLD: 0.3 % (ref 0–1.9)
BENZODIAZ UR QL SCN>200 NG/ML: NORMAL
BILIRUB SERPL-MCNC: 0.3 MG/DL (ref 0.1–1)
BILIRUB UR QL STRIP: NEGATIVE
BUN SERPL-MCNC: 18 MG/DL (ref 6–20)
BZE UR QL SCN: NEGATIVE
CALCIUM SERPL-MCNC: 10 MG/DL (ref 8.7–10.5)
CANNABINOIDS UR QL SCN: NORMAL
CHLORIDE SERPL-SCNC: 106 MMOL/L (ref 95–110)
CLARITY UR REFRACT.AUTO: CLEAR
CO2 SERPL-SCNC: 20 MMOL/L (ref 23–29)
COLOR UR AUTO: YELLOW
CREAT SERPL-MCNC: 0.9 MG/DL (ref 0.5–1.4)
CREAT UR-MCNC: 180.4 MG/DL (ref 15–325)
DIFFERENTIAL METHOD: ABNORMAL
EOSINOPHIL # BLD AUTO: 0.3 K/UL (ref 0–0.5)
EOSINOPHIL NFR BLD: 3.7 % (ref 0–8)
ERYTHROCYTE [DISTWIDTH] IN BLOOD BY AUTOMATED COUNT: 14.5 % (ref 11.5–14.5)
EST. GFR  (AFRICAN AMERICAN): >60 ML/MIN/1.73 M^2
EST. GFR  (NON AFRICAN AMERICAN): >60 ML/MIN/1.73 M^2
ETHANOL SERPL-MCNC: <10 MG/DL
GLUCOSE SERPL-MCNC: 165 MG/DL (ref 70–110)
GLUCOSE UR QL STRIP: NEGATIVE
HCT VFR BLD AUTO: 38.8 % (ref 37–48.5)
HGB BLD-MCNC: 12 G/DL (ref 12–16)
HGB UR QL STRIP: NEGATIVE
IMM GRANULOCYTES # BLD AUTO: 0.01 K/UL (ref 0–0.04)
IMM GRANULOCYTES NFR BLD AUTO: 0.1 % (ref 0–0.5)
KETONES UR QL STRIP: NEGATIVE
LEUKOCYTE ESTERASE UR QL STRIP: NEGATIVE
LYMPHOCYTES # BLD AUTO: 2.3 K/UL (ref 1–4.8)
LYMPHOCYTES NFR BLD: 33 % (ref 18–48)
MCH RBC QN AUTO: 25.3 PG (ref 27–31)
MCHC RBC AUTO-ENTMCNC: 30.9 G/DL (ref 32–36)
MCV RBC AUTO: 82 FL (ref 82–98)
METHADONE UR QL SCN>300 NG/ML: NEGATIVE
MONOCYTES # BLD AUTO: 0.6 K/UL (ref 0.3–1)
MONOCYTES NFR BLD: 8.1 % (ref 4–15)
NEUTROPHILS # BLD AUTO: 3.9 K/UL (ref 1.8–7.7)
NEUTROPHILS NFR BLD: 54.8 % (ref 38–73)
NITRITE UR QL STRIP: NEGATIVE
NRBC BLD-RTO: 0 /100 WBC
OPIATES UR QL SCN: NEGATIVE
PCP UR QL SCN>25 NG/ML: NEGATIVE
PH UR STRIP: 6 [PH] (ref 5–8)
PLATELET # BLD AUTO: 263 K/UL (ref 150–350)
PMV BLD AUTO: 9.9 FL (ref 9.2–12.9)
POTASSIUM SERPL-SCNC: 4 MMOL/L (ref 3.5–5.1)
PROT SERPL-MCNC: 8.1 G/DL (ref 6–8.4)
PROT UR QL STRIP: NEGATIVE
RBC # BLD AUTO: 4.74 M/UL (ref 4–5.4)
SARS-COV-2 RDRP RESP QL NAA+PROBE: NEGATIVE
SODIUM SERPL-SCNC: 139 MMOL/L (ref 136–145)
SP GR UR STRIP: >=1.03 (ref 1–1.03)
T4 FREE SERPL-MCNC: 1.16 NG/DL (ref 0.71–1.51)
TOXICOLOGY INFORMATION: NORMAL
TROPONIN I SERPL DL<=0.01 NG/ML-MCNC: 0.01 NG/ML (ref 0–0.03)
TSH SERPL DL<=0.005 MIU/L-ACNC: 0.37 UIU/ML (ref 0.4–4)
URN SPEC COLLECT METH UR: ABNORMAL
UROBILINOGEN UR STRIP-ACNC: NEGATIVE EU/DL
WBC # BLD AUTO: 7.04 K/UL (ref 3.9–12.7)

## 2020-09-23 PROCEDURE — 85025 COMPLETE CBC W/AUTO DIFF WBC: CPT | Mod: ER

## 2020-09-23 PROCEDURE — 80320 DRUG SCREEN QUANTALCOHOLS: CPT | Mod: ER

## 2020-09-23 PROCEDURE — 86803 HEPATITIS C AB TEST: CPT

## 2020-09-23 PROCEDURE — 93010 ELECTROCARDIOGRAM REPORT: CPT | Mod: ,,, | Performed by: INTERNAL MEDICINE

## 2020-09-23 PROCEDURE — 80053 COMPREHEN METABOLIC PANEL: CPT | Mod: ER

## 2020-09-23 PROCEDURE — 96372 THER/PROPH/DIAG INJ SC/IM: CPT | Mod: ER

## 2020-09-23 PROCEDURE — 99285 EMERGENCY DEPT VISIT HI MDM: CPT | Mod: 25,ER

## 2020-09-23 PROCEDURE — 80307 DRUG TEST PRSMV CHEM ANLYZR: CPT | Mod: ER

## 2020-09-23 PROCEDURE — 84484 ASSAY OF TROPONIN QUANT: CPT | Mod: ER

## 2020-09-23 PROCEDURE — 80329 ANALGESICS NON-OPIOID 1 OR 2: CPT | Mod: ER

## 2020-09-23 PROCEDURE — 93005 ELECTROCARDIOGRAM TRACING: CPT | Mod: ER

## 2020-09-23 PROCEDURE — 84439 ASSAY OF FREE THYROXINE: CPT | Mod: ER

## 2020-09-23 PROCEDURE — 84443 ASSAY THYROID STIM HORMONE: CPT | Mod: ER

## 2020-09-23 PROCEDURE — 63600175 PHARM REV CODE 636 W HCPCS: Mod: ER | Performed by: EMERGENCY MEDICINE

## 2020-09-23 PROCEDURE — U0002 COVID-19 LAB TEST NON-CDC: HCPCS | Mod: ER

## 2020-09-23 PROCEDURE — 81003 URINALYSIS AUTO W/O SCOPE: CPT | Mod: ER,59

## 2020-09-23 PROCEDURE — 93010 EKG 12-LEAD: ICD-10-PCS | Mod: ,,, | Performed by: INTERNAL MEDICINE

## 2020-09-23 RX ORDER — DOXEPIN HYDROCHLORIDE 75 MG/1
75 CAPSULE ORAL NIGHTLY
Status: ON HOLD | COMMUNITY
End: 2022-02-04 | Stop reason: HOSPADM

## 2020-09-23 RX ORDER — RISPERIDONE 0.5 MG/1
TABLET ORAL EVERY MORNING
Status: ON HOLD | COMMUNITY
End: 2022-02-04 | Stop reason: HOSPADM

## 2020-09-23 RX ORDER — RISPERIDONE 1 MG/1
1 TABLET, ORALLY DISINTEGRATING ORAL NIGHTLY
Status: ON HOLD | COMMUNITY
End: 2022-02-04 | Stop reason: HOSPADM

## 2020-09-23 RX ORDER — DIVALPROEX SODIUM 250 MG/1
250 TABLET, DELAYED RELEASE ORAL 3 TIMES DAILY
Status: ON HOLD | COMMUNITY
End: 2022-02-04 | Stop reason: HOSPADM

## 2020-09-23 RX ORDER — ZIPRASIDONE MESYLATE 20 MG/ML
20 INJECTION, POWDER, LYOPHILIZED, FOR SOLUTION INTRAMUSCULAR
Status: COMPLETED | OUTPATIENT
Start: 2020-09-23 | End: 2020-09-23

## 2020-09-23 RX ADMIN — ZIPRASIDONE MESYLATE 20 MG: 20 INJECTION, POWDER, LYOPHILIZED, FOR SOLUTION INTRAMUSCULAR at 07:09

## 2020-09-23 NOTE — ED PROVIDER NOTES
"Encounter Date: 2020       History     Chief Complaint   Patient presents with    Aggressive Behavior     per [facility] pt became combative at home after smoking mojo. psych history and not taking medication. Received 5mg IN Versed PTA. Upon arrival pt was cooperative. Denies HI/SI or hallucinations.      Chief complaint:  Agitation    History of present illness:  35-year-old female, according to her father was staying at her aunt's house and is suspected to have taken Mojo.  The father states that the patient is always on the lookout for drugs.  She stays in multiple family homes or walks the streets looking for drugs according to the father.  The patient was medicated with Versed 5 mg IM prior to arrival relates that she was feeling bad having chest heaviness" earlier today.  She denies any pain at present.  Patient does endorse the fact that she is not taking her medicines compliantly with the excuse that she is forgetting to.  Patient became very combative at the scene requiring EMS call and arrived as noted following sedation.  She is an unreliable historian, however her father has provided insight.        Review of patient's allergies indicates:  No Known Allergies  Past Medical History:   Diagnosis Date    Bipolar disorder     Schizophrenia      Past Surgical History:   Procedure Laterality Date     SECTION      CHOLECYSTECTOMY      TUBAL LIGATION       History reviewed. No pertinent family history.  Social History     Tobacco Use    Smoking status: Current Some Day Smoker     Types: Cigarettes    Smokeless tobacco: Never Used   Substance Use Topics    Alcohol use: Yes     Frequency: 4 or more times a week     Comment: occasionally     Drug use: Yes     Types: "Crack" cocaine, Codeine, Heroin, Fentanyl, Marijuana     Comment: heronin, fentanyl, crack cocaine, weed - last used this morning     Review of Systems   Constitutional: Positive for activity change. Negative for chills and fever. "   HENT: Negative.  Negative for sore throat.    Eyes: Negative.    Respiratory: Positive for chest tightness and shortness of breath.    Cardiovascular: Positive for chest pain.   Gastrointestinal: Negative.  Negative for nausea.   Genitourinary: Negative.  Negative for dysuria.   Musculoskeletal: Negative.  Negative for back pain.   Skin: Negative.  Negative for rash.   Neurological: Negative for dizziness, tremors, seizures, syncope, facial asymmetry, speech difficulty, weakness, light-headedness, numbness and headaches.   Hematological: Does not bruise/bleed easily.   Psychiatric/Behavioral: Positive for agitation, behavioral problems, confusion and hallucinations. Negative for suicidal ideas. The patient is nervous/anxious and is hyperactive.    All other systems reviewed and are negative.      Physical Exam     Initial Vitals [09/23/20 1629]   BP Pulse Resp Temp SpO2   (!) 140/90 (!) 113 16 99.2 °F (37.3 °C) 97 %      MAP       --         Physical Exam    Nursing note and vitals reviewed.  Constitutional: She appears well-developed and well-nourished.   HENT:   Head: Normocephalic and atraumatic.   Mouth/Throat: No oropharyngeal exudate.   Eyes: Conjunctivae and EOM are normal. Pupils are equal, round, and reactive to light.   Neck: Normal range of motion. Neck supple. No thyromegaly present.   Cardiovascular: Normal rate, regular rhythm, normal heart sounds and intact distal pulses. Exam reveals no gallop and no friction rub.    No murmur heard.  Pulmonary/Chest: Breath sounds normal. No respiratory distress. She has no wheezes. She has no rhonchi. She exhibits no tenderness.   Abdominal: Soft. Bowel sounds are normal. She exhibits no distension. There is no abdominal tenderness. There is no rebound and no guarding.   Musculoskeletal: Normal range of motion. No tenderness or edema.   Lymphadenopathy:     She has no cervical adenopathy.   Neurological: She is alert and oriented to person, place, and time. She  has normal strength. No cranial nerve deficit or sensory deficit.   Skin: Skin is warm and dry. No rash noted.   Psychiatric: Judgment normal. Her mood appears anxious. Her affect is labile and inappropriate. Her speech is rapid and/or pressured and tangential. She is agitated, aggressive, actively hallucinating and combative. Thought content is paranoid. Cognition and memory are impaired. She expresses no homicidal and no suicidal ideation. She is inattentive.         ED Course   Procedures  Labs Reviewed   CBC W/ AUTO DIFFERENTIAL - Abnormal; Notable for the following components:       Result Value    Mean Corpuscular Hemoglobin 25.3 (*)     Mean Corpuscular Hemoglobin Conc 30.9 (*)     All other components within normal limits   COMPREHENSIVE METABOLIC PANEL - Abnormal; Notable for the following components:    CO2 20 (*)     Glucose 165 (*)     All other components within normal limits   TSH - Abnormal; Notable for the following components:    TSH 0.370 (*)     All other components within normal limits   URINALYSIS, REFLEX TO URINE CULTURE - Abnormal; Notable for the following components:    Specific Gravity, UA >=1.030 (*)     All other components within normal limits    Narrative:     Specimen Source->Urine   ACETAMINOPHEN LEVEL - Abnormal; Notable for the following components:    Acetaminophen (Tylenol), Serum <3.0 (*)     All other components within normal limits   HEPATITIS C ANTIBODY   DRUG SCREEN PANEL, URINE EMERGENCY    Narrative:     Specimen Source->Urine   ALCOHOL,MEDICAL (ETHANOL)   TROPONIN I   T4, FREE   SARS-COV-2 RNA AMPLIFICATION, QUAL     EKG Readings: (Independently Interpreted)   Initial Reading: No STEMI. Rhythm: Sinus Tachycardia. Heart Rate: 106. Ectopy: No Ectopy. Conduction: Normal. ST Segments: Normal ST Segments. T Waves: Normal. Clinical Impression: Sinus Tachycardia     ECG Results          EKG 12-lead (Final result)  Result time 09/25/20 17:23:13    Final result by Interface, Lab In  [facility] (09/25/20 17:23:13)                 Narrative:    Test Reason : R07.9,    Vent. Rate : 106 BPM     Atrial Rate : 106 BPM     P-R Int : 150 ms          QRS Dur : 084 ms      QT Int : 342 ms       P-R-T Axes : 063 -03 016 degrees     QTc Int : 454 ms    Sinus tachycardia  Minimal voltage criteria for LVH, may be normal variant  Borderline Abnormal ECG  When compared with ECG of 01-AUG-2020 05:42,  No significant change was found  Confirmed by NEIL RUTH, ROSANA SILVA ([id number]) on 9/25/2020 5:23:08 PM    Referred By: NEERAJ   SELF           Confirmed By:ROSANA TREJO MD                            Imaging Results    None          Medical Decision Making:   ED Management:  By virtue of father's history and patient's behavior prior to the Versed it is incumbent that she receive inpatient psychiatric care secondary to her self-destructive behavior of not taking her medications and drug-seeking.          Vitals:    09/23/20 1629 09/23/20 1634 09/23/20 1717 09/23/20 1746   BP: (!) 140/90  (!) 139/91 138/88   Pulse: (!) 113 (!) 113 99 99   Resp: 16  14 (!) 21   Temp: 99.2 °F (37.3 °C)      TempSrc: Oral      SpO2: 97%  97% 98%   Weight: 104.3 kg (230 lb)       09/23/20 1817 09/23/20 1901 09/23/20 1922 09/23/20 1950   BP: (!) 140/88 136/84 129/87 (!) 159/79   Pulse: 97 91 95 91   Resp: 20 20 20 20   Temp:       TempSrc:       SpO2: 99%  99% 98%   Weight:        09/23/20 2018 09/23/20 2123 09/23/20 2323 09/23/20 2352   BP: 132/84  135/84 135/84   Pulse: 82 72 80 80   Resp: 20  18 18   Temp: 98.9 °F (37.2 °C)  98.9 °F (37.2 °C) 98.9 °F (37.2 °C)   TempSrc: Oral   Oral   SpO2: 99% 100%  100%   Weight:           Results for orders placed or performed during the hospital encounter of 09/23/20   Hepatitis C antibody   Result Value Ref Range    Hepatitis C Ab Negative Negative   CBC auto differential   Result Value Ref Range    WBC 7.04 3.90 - 12.70 K/uL    RBC 4.74 4.00 - 5.40 M/uL    Hemoglobin 12.0 12.0 - 16.0 g/dL     Hematocrit 38.8 37.0 - 48.5 %    Mean Corpuscular Volume 82 82 - 98 fL    Mean Corpuscular Hemoglobin 25.3 (L) 27.0 - 31.0 pg    Mean Corpuscular Hemoglobin Conc 30.9 (L) 32.0 - 36.0 g/dL    RDW 14.5 11.5 - 14.5 %    Platelets 263 150 - 350 K/uL    MPV 9.9 9.2 - 12.9 fL    Immature Granulocytes 0.1 0.0 - 0.5 %    Gran # (ANC) 3.9 1.8 - 7.7 K/uL    Immature Grans (Abs) 0.01 0.00 - 0.04 K/uL    Lymph # 2.3 1.0 - 4.8 K/uL    Mono # 0.6 0.3 - 1.0 K/uL    Eos # 0.3 0.0 - 0.5 K/uL    Baso # 0.02 0.00 - 0.20 K/uL    nRBC 0 0 /100 WBC    Gran% 54.8 38.0 - 73.0 %    Lymph% 33.0 18.0 - 48.0 %    Mono% 8.1 4.0 - 15.0 %    Eosinophil% 3.7 0.0 - 8.0 %    Basophil% 0.3 0.0 - 1.9 %    Differential Method Automated    Comprehensive metabolic panel   Result Value Ref Range    Sodium 139 136 - 145 mmol/L    Potassium 4.0 3.5 - 5.1 mmol/L    Chloride 106 95 - 110 mmol/L    CO2 20 (L) 23 - 29 mmol/L    Glucose 165 (H) 70 - 110 mg/dL    BUN, Bld 18 6 - 20 mg/dL    Creatinine 0.9 0.5 - 1.4 mg/dL    Calcium 10.0 8.7 - 10.5 mg/dL    Total Protein 8.1 6.0 - 8.4 g/dL    Albumin 3.9 3.5 - 5.2 g/dL    Total Bilirubin 0.3 0.1 - 1.0 mg/dL    Alkaline Phosphatase 62 55 - 135 U/L    AST 26 10 - 40 U/L    ALT 20 10 - 44 U/L    Anion Gap 13 8 - 16 mmol/L    eGFR if African American >60.0 >60 mL/min/1.73 m^2    eGFR if non African American >60.0 >60 mL/min/1.73 m^2   TSH   Result Value Ref Range    TSH 0.370 (L) 0.400 - 4.000 uIU/mL   Urinalysis, Reflex to Urine Culture Urine, Clean Catch    Specimen: Urine   Result Value Ref Range    Specimen UA Urine, Clean Catch     Color, UA Yellow Yellow, Straw, Charlee    Appearance, UA Clear Clear    pH, UA 6.0 5.0 - 8.0    Specific Gravity, UA >=1.030 (A) 1.005 - 1.030    Protein, UA Negative Negative    Glucose, UA Negative Negative    Ketones, UA Negative Negative    Bilirubin (UA) Negative Negative    Occult Blood UA Negative Negative    Nitrite, UA Negative Negative    Urobilinogen, UA Negative <2.0 EU/dL     Leukocytes, UA Negative Negative   Drug screen panel, emergency   Result Value Ref Range    Benzodiazepines Presumptve Positive     Methadone metabolites Negative     Cocaine (Metab.) Negative     Opiate Scrn, Ur Negative     Barbiturate Screen, Ur Negative     Amphetamine Screen, Ur Presumptive Positive     THC Presumptive Positive     Phencyclidine Negative     Creatinine, Random Ur 180.4 15.0 - 325.0 mg/dL    Toxicology Information SEE COMMENT    Ethanol   Result Value Ref Range    Alcohol, Medical, Serum <10 <10 mg/dL   Acetaminophen level   Result Value Ref Range    Acetaminophen (Tylenol), Serum <3.0 (L) 10.0 - 20.0 ug/mL   Troponin I   Result Value Ref Range    Troponin I 0.007 0.000 - 0.026 ng/mL   T4, free   Result Value Ref Range    Free T4 1.16 0.71 - 1.51 ng/dL   COVID-19 Rapid Screening   Result Value Ref Range    SARS-CoV-2 RNA, Amplification, Qual Negative Negative         Imaging Results    None         Medications   ziprasidone injection 20 mg (20 mg Intramuscular Given 9/23/20 1947)     1656 PEC. Pt will be PEC'd. Informed patient and family that psychiatric services are not available at this facility. Notified them of the need to transfer to another facility with available services. Pt will need to be medically cleared prior to transfer. They understand and agree with the plan as discussed. Answered any questions at this time.      6:03 PM - Transfer of Care: Patient care transferred from Dr. Mcduffie to Dr. Erazo, pending medical clearance.      6:06 PM - Re-evaluation:  The patient is resting comfortably and is in no acute distress.  Agree with Dr. Mcduffie' assessment  Pt states she is hearing voices and they are telling her to hurt herself.  No physical injury. Discussed test results and notified of pending labs. Answered questions at this time.     7:41 PM pt getting more anxious/aggitated. Will give geodon and monitor closely.    8:11 PM  - Re-evaluation: The patient is resting comfortably  and is in no acute distress. They have been medically cleared and are awaiting placement/transfer to a psychiatric facility for further evaluation. Pt is stable for transfer at this time.      All historical, clinical, and laboratory findings were reviewed with the patient/family in detail along with the indications for transfer to an inpatient psychiatric services as we do not have those services available at this facility.  All remaining questions and concerns were addressed at that time and the patient/family agrees to proceed accordingly.  Similarly all pertinent details of the encounter were discussed with Dr. Bryant at  [facility] who agrees to accept the patient in transfer based on the needs/patient preferences outlined above.  Patient will be transferred by [facility] secondary to a need for secure/protective transport given the nature of the patients illness.  Marco Erazo MD  10:46 PM            Medication List      ASK your doctor about these medications    DEPAKOTE 250 MG EC tablet  Generic drug: divalproex     doxepin 75 MG capsule  Commonly known as: SINEQUAN     metFORMIN 500 MG tablet  Commonly known as: GLUCOPHAGE  Take 1 tablet (500 mg total) by mouth 2 (two) times daily with meals.     naloxone 1 mg/mL injection  Commonly known as: NARCAN  2 mg (1 mg per nostril) by Nasal route as needed for opioid overdose; may repeat in 3 to 5 minutes if not effective. Call 911     * risperiDONE 0.5 MG Tab  Commonly known as: RISPERDAL     * risperiDONE 1 MG Tbdl  Commonly known as: RISPERDAL M-TABS         * This list has 2 medication(s) that are the same as other medications prescribed for you. Read the directions carefully, and ask your doctor or other care provider to review them with you.               Current Discharge Medication List            ED Diagnosis  1. Manic disorder, recurrent episode, severe, with psychotic behavior    2. Chest pain    3. Psychosis, unspecified psychosis type     4. Auditory hallucinations    5. Polysubstance abuse    6. Noncompliance with medication regimen                              Clinical Impression:          Disposition:   Disposition: Transferred  Condition: Stable     ED Disposition Condition    Transfer to Psych Facility            ICD-10-CM ICD-9-CM   1. Manic disorder, recurrent episode, severe, with psychotic behavior  F31.2 296.14   2. Chest pain  R07.9 786.50   3. Psychosis, unspecified psychosis type  F29 298.9   4. Auditory hallucinations  R44.0 780.1   5. Polysubstance abuse  F19.10 305.90   6. Noncompliance with medication regimen  Z91.14 V15.81       ED Prescriptions     None        Follow-up Information    None                                      Marco Erazo Jr., MD  09/24/20 0047       Marco Erazo Jr., MD  10/05/20 0455

## 2020-09-24 LAB — HCV AB SERPL QL IA: NEGATIVE

## 2020-09-24 NOTE — ED NOTES
Pt belonging inventoried:  5 prescription bottles:      1 (1mg) Risperidone 30 pills      1 (250mg) divalproex 50 pills      1 (0.5 mg) Risperidone 16 pills      1 (50 mg)  Doxepin 23 pills      1 (25mg) Doxepin 21 pills  I black shirt  1 pair square socorro earrings  1 blue and white polka dot headband  Items placed in locker #1, code #1111  Pt refused to let us take her earrings or headband.

## 2020-09-24 NOTE — ED NOTES
"Pt verbalizes that she "does not feel right" and "wants to go to a mental health place". Pt currently denies suicidal and homicidal ideations at this time. Pt is teary but denies pain at this time.   "

## 2020-10-07 ENCOUNTER — HOSPITAL ENCOUNTER (EMERGENCY)
Facility: HOSPITAL | Age: 35
Discharge: HOME | End: 2020-10-07
Attending: EMERGENCY MEDICINE
Payer: MEDICAID

## 2020-10-07 VITALS
HEART RATE: 86 BPM | SYSTOLIC BLOOD PRESSURE: 138 MMHG | RESPIRATION RATE: 16 BRPM | TEMPERATURE: 98 F | WEIGHT: 230 LBS | BODY MASS INDEX: 34.97 KG/M2 | OXYGEN SATURATION: 100 % | DIASTOLIC BLOOD PRESSURE: 75 MMHG

## 2020-10-07 DIAGNOSIS — F14.10 COCAINE ABUSE: ICD-10-CM

## 2020-10-07 DIAGNOSIS — F19.10 SUBSTANCE ABUSE: Primary | ICD-10-CM

## 2020-10-07 DIAGNOSIS — R53.1 WEAKNESS: ICD-10-CM

## 2020-10-07 LAB
ALBUMIN SERPL BCP-MCNC: 3.4 G/DL (ref 3.5–5.2)
ALP SERPL-CCNC: 56 U/L (ref 55–135)
ALT SERPL W/O P-5'-P-CCNC: 11 U/L (ref 10–44)
AMPHET+METHAMPHET UR QL: NEGATIVE
ANION GAP SERPL CALC-SCNC: 13 MMOL/L (ref 8–16)
APAP SERPL-MCNC: <3 UG/ML (ref 10–20)
AST SERPL-CCNC: 14 U/L (ref 10–40)
B-HCG UR QL: NEGATIVE
BACTERIA #/AREA URNS AUTO: ABNORMAL /HPF
BARBITURATES UR QL SCN>200 NG/ML: NEGATIVE
BASOPHILS # BLD AUTO: 0.03 K/UL (ref 0–0.2)
BASOPHILS NFR BLD: 0.2 % (ref 0–1.9)
BENZODIAZ UR QL SCN>200 NG/ML: NEGATIVE
BILIRUB SERPL-MCNC: 0.1 MG/DL (ref 0.1–1)
BILIRUB UR QL STRIP: NEGATIVE
BUN SERPL-MCNC: 16 MG/DL (ref 6–20)
BZE UR QL SCN: NEGATIVE
CALCIUM SERPL-MCNC: 8.7 MG/DL (ref 8.7–10.5)
CANNABINOIDS UR QL SCN: NEGATIVE
CHLORIDE SERPL-SCNC: 101 MMOL/L (ref 95–110)
CK SERPL-CCNC: 177 U/L (ref 20–180)
CLARITY UR REFRACT.AUTO: CLEAR
CO2 SERPL-SCNC: 23 MMOL/L (ref 23–29)
COLOR UR AUTO: YELLOW
CREAT SERPL-MCNC: 0.9 MG/DL (ref 0.5–1.4)
CREAT UR-MCNC: 31.3 MG/DL (ref 15–325)
D DIMER PPP IA.FEU-MCNC: 1.57 MG/L FEU
DIFFERENTIAL METHOD: ABNORMAL
EOSINOPHIL # BLD AUTO: 0.2 K/UL (ref 0–0.5)
EOSINOPHIL NFR BLD: 1.4 % (ref 0–8)
ERYTHROCYTE [DISTWIDTH] IN BLOOD BY AUTOMATED COUNT: 14.1 % (ref 11.5–14.5)
EST. GFR  (AFRICAN AMERICAN): >60 ML/MIN/1.73 M^2
EST. GFR  (NON AFRICAN AMERICAN): >60 ML/MIN/1.73 M^2
ETHANOL SERPL-MCNC: <10 MG/DL
GLUCOSE SERPL-MCNC: 182 MG/DL (ref 70–110)
GLUCOSE UR QL STRIP: NEGATIVE
HCG INTACT+B SERPL-ACNC: <1.2 MIU/ML
HCT VFR BLD AUTO: 33.8 % (ref 37–48.5)
HGB BLD-MCNC: 10.5 G/DL (ref 12–16)
HGB UR QL STRIP: ABNORMAL
IMM GRANULOCYTES # BLD AUTO: 0.11 K/UL (ref 0–0.04)
IMM GRANULOCYTES NFR BLD AUTO: 0.8 % (ref 0–0.5)
KETONES UR QL STRIP: NEGATIVE
LEUKOCYTE ESTERASE UR QL STRIP: NEGATIVE
LYMPHOCYTES # BLD AUTO: 3.1 K/UL (ref 1–4.8)
LYMPHOCYTES NFR BLD: 21.3 % (ref 18–48)
MCH RBC QN AUTO: 25.6 PG (ref 27–31)
MCHC RBC AUTO-ENTMCNC: 31.1 G/DL (ref 32–36)
MCV RBC AUTO: 82 FL (ref 82–98)
METHADONE UR QL SCN>300 NG/ML: NEGATIVE
MICROSCOPIC COMMENT: ABNORMAL
MONOCYTES # BLD AUTO: 0.9 K/UL (ref 0.3–1)
MONOCYTES NFR BLD: 6.1 % (ref 4–15)
NEUTROPHILS # BLD AUTO: 10.2 K/UL (ref 1.8–7.7)
NEUTROPHILS NFR BLD: 70.2 % (ref 38–73)
NITRITE UR QL STRIP: NEGATIVE
NRBC BLD-RTO: 0 /100 WBC
OPIATES UR QL SCN: NEGATIVE
PCP UR QL SCN>25 NG/ML: NEGATIVE
PH UR STRIP: 7 [PH] (ref 5–8)
PLATELET # BLD AUTO: 266 K/UL (ref 150–350)
PMV BLD AUTO: 10.5 FL (ref 9.2–12.9)
POTASSIUM SERPL-SCNC: 3.7 MMOL/L (ref 3.5–5.1)
PROT SERPL-MCNC: 7.1 G/DL (ref 6–8.4)
PROT UR QL STRIP: NEGATIVE
RBC # BLD AUTO: 4.1 M/UL (ref 4–5.4)
RBC #/AREA URNS AUTO: 20 /HPF (ref 0–4)
SODIUM SERPL-SCNC: 137 MMOL/L (ref 136–145)
SP GR UR STRIP: 1.01 (ref 1–1.03)
SQUAMOUS #/AREA URNS AUTO: 4 /HPF
TOXICOLOGY INFORMATION: NORMAL
TROPONIN I SERPL DL<=0.01 NG/ML-MCNC: 0.01 NG/ML (ref 0–0.03)
TSH SERPL DL<=0.005 MIU/L-ACNC: 1.29 UIU/ML (ref 0.4–4)
URN SPEC COLLECT METH UR: ABNORMAL
UROBILINOGEN UR STRIP-ACNC: NEGATIVE EU/DL
VALPROATE SERPL-MCNC: 35.6 UG/ML (ref 50–100)
WBC # BLD AUTO: 14.57 K/UL (ref 3.9–12.7)

## 2020-10-07 PROCEDURE — 80329 ANALGESICS NON-OPIOID 1 OR 2: CPT | Mod: ER

## 2020-10-07 PROCEDURE — 80053 COMPREHEN METABOLIC PANEL: CPT | Mod: ER

## 2020-10-07 PROCEDURE — 84484 ASSAY OF TROPONIN QUANT: CPT | Mod: ER

## 2020-10-07 PROCEDURE — 99285 EMERGENCY DEPT VISIT HI MDM: CPT | Mod: 25,ER

## 2020-10-07 PROCEDURE — 84702 CHORIONIC GONADOTROPIN TEST: CPT | Mod: ER

## 2020-10-07 PROCEDURE — 85025 COMPLETE CBC W/AUTO DIFF WBC: CPT | Mod: ER

## 2020-10-07 PROCEDURE — 82550 ASSAY OF CK (CPK): CPT | Mod: ER

## 2020-10-07 PROCEDURE — 81025 URINE PREGNANCY TEST: CPT | Mod: ER

## 2020-10-07 PROCEDURE — 93010 EKG 12-LEAD: ICD-10-PCS | Mod: ,,, | Performed by: INTERNAL MEDICINE

## 2020-10-07 PROCEDURE — 93010 ELECTROCARDIOGRAM REPORT: CPT | Mod: ,,, | Performed by: INTERNAL MEDICINE

## 2020-10-07 PROCEDURE — 80307 DRUG TEST PRSMV CHEM ANLYZR: CPT | Mod: ER

## 2020-10-07 PROCEDURE — 80164 ASSAY DIPROPYLACETIC ACD TOT: CPT

## 2020-10-07 PROCEDURE — 80320 DRUG SCREEN QUANTALCOHOLS: CPT | Mod: ER

## 2020-10-07 PROCEDURE — 93005 ELECTROCARDIOGRAM TRACING: CPT | Mod: ER

## 2020-10-07 PROCEDURE — 84443 ASSAY THYROID STIM HORMONE: CPT | Mod: ER

## 2020-10-07 PROCEDURE — 25500020 PHARM REV CODE 255: Mod: ER | Performed by: EMERGENCY MEDICINE

## 2020-10-07 PROCEDURE — 85379 FIBRIN DEGRADATION QUANT: CPT | Mod: ER

## 2020-10-07 PROCEDURE — 81000 URINALYSIS NONAUTO W/SCOPE: CPT | Mod: 59,ER

## 2020-10-07 RX ADMIN — IOHEXOL 100 ML: 350 INJECTION, SOLUTION INTRAVENOUS at 05:10

## 2020-10-07 NOTE — ED PROVIDER NOTES
"Encounter Date: 10/7/2020       History   No chief complaint on file.    Chief complaint: Behavior problems/weakness    History of present illness:  35-year-old female with EMS called to the scene regarding unusual behavior.  Patient has history of bipolar disorder with recent hospitalization for manic phase.  Allegedly having hallucinations and had originally denied any drug use but later confessed that she indeed used cocaine as well as smoking Mojo prior to this event.  The patient complains of weakness overall.  Questionable compliance with her medications that were recently prescribed by the psychiatric facility from which she was discharged last week.  She denies fever or chills.  Denies chest pain or shortness of breath.  No suicidal or homicidal ideation.  At current triage questioning the patient denies any auditory or visual hallucinations.  No complaints of headache, chest pain, nausea vomiting, or history of trauma.        Review of patient's allergies indicates:  No Known Allergies  Past Medical History:   Diagnosis Date    Bipolar disorder     Schizophrenia      Past Surgical History:   Procedure Laterality Date     SECTION      CHOLECYSTECTOMY      TUBAL LIGATION       No family history on file.  Social History     Tobacco Use    Smoking status: Current Some Day Smoker     Types: Cigarettes    Smokeless tobacco: Never Used   Substance Use Topics    Alcohol use: Yes     Frequency: 4 or more times a week     Comment: occasionally     Drug use: Yes     Types: "Crack" cocaine, Codeine, Heroin, Fentanyl, Marijuana     Comment: heronin, fentanyl, crack cocaine, weed - last used this morning     Review of Systems   Constitutional: Positive for activity change. Negative for diaphoresis and fatigue.   HENT: Negative for congestion, nosebleeds, rhinorrhea, sore throat and trouble swallowing.    Eyes: Negative for visual disturbance.   Respiratory: Negative.    Cardiovascular: Negative.  "   Gastrointestinal: Negative for abdominal pain, nausea and vomiting.   Genitourinary: Negative for dysuria, flank pain, hematuria and pelvic pain.   Musculoskeletal: Negative for arthralgias, back pain, gait problem and joint swelling.   Neurological: Positive for weakness (Generalized). Negative for dizziness, tremors, seizures, syncope, facial asymmetry, speech difficulty, light-headedness, numbness and headaches.   Psychiatric/Behavioral: Positive for agitation, behavioral problems, decreased concentration and hallucinations (Denies). Negative for self-injury and suicidal ideas. The patient is not nervous/anxious and is not hyperactive.    All other systems reviewed and are negative.      Physical Exam     Initial Vitals   BP Pulse Resp Temp SpO2   -- -- -- -- --      MAP       --         Physical Exam    Nursing note and vitals reviewed.  Constitutional: She appears well-developed and well-nourished. No distress.   HENT:   Head: Normocephalic and atraumatic.   Right Ear: External ear normal.   Left Ear: External ear normal.   Nose: Nose normal.   Mouth/Throat: Oropharynx is clear and moist. No oropharyngeal exudate.   Eyes: Conjunctivae and EOM are normal. Pupils are equal, round, and reactive to light. Right eye exhibits no discharge. Left eye exhibits no discharge. No scleral icterus.   Neck: Normal range of motion. Neck supple. No thyromegaly present.   Cardiovascular: Regular rhythm and intact distal pulses. Tachycardia present.    No murmur heard.  Pulmonary/Chest: Breath sounds normal. No respiratory distress.   Abdominal: Soft. Bowel sounds are normal. She exhibits no distension. There is no abdominal tenderness.   Musculoskeletal: Normal range of motion. No tenderness or edema.   Lymphadenopathy:     She has no cervical adenopathy.   Neurological: She is alert and oriented to person, place, and time. She has normal strength. No sensory deficit. GCS score is 15. GCS eye subscore is 4. GCS verbal subscore  is 5. GCS motor subscore is 6.   Skin: Skin is warm and dry. Capillary refill takes less than 2 seconds. No rash noted.   Psychiatric: She has a normal mood and affect. Thought content normal.   No auditory or visual hallucinations.  No suicidal or homicidal ideation.  Cooperative.  Judgment appears impaired.         ED Course   Procedures  Labs Reviewed - No data to display   Labs Reviewed   D DIMER, QUANTITATIVE - Abnormal; Notable for the following components:       Result Value    D-Dimer 1.57 (*)     All other components within normal limits   CBC W/ AUTO DIFFERENTIAL - Abnormal; Notable for the following components:    WBC 14.57 (*)     Hemoglobin 10.5 (*)     Hematocrit 33.8 (*)     Mean Corpuscular Hemoglobin 25.6 (*)     Mean Corpuscular Hemoglobin Conc 31.1 (*)     Immature Granulocytes 0.8 (*)     Gran # (ANC) 10.2 (*)     Immature Grans (Abs) 0.11 (*)     All other components within normal limits   COMPREHENSIVE METABOLIC PANEL - Abnormal; Notable for the following components:    Glucose 182 (*)     Albumin 3.4 (*)     All other components within normal limits   ACETAMINOPHEN LEVEL - Abnormal; Notable for the following components:    Acetaminophen (Tylenol), Serum <3.0 (*)     All other components within normal limits   TSH   ALCOHOL,MEDICAL (ETHANOL)   TROPONIN I   CK   HCG, QUANTITATIVE, PREGNANCY   HCG, QUANTITATIVE, PREGNANCY   URINALYSIS, REFLEX TO URINE CULTURE   DRUG SCREEN PANEL, URINE EMERGENCY   VALPROIC ACID   PREGNANCY TEST, URINE RAPID     Labs Reviewed   D DIMER, QUANTITATIVE - Abnormal; Notable for the following components:       Result Value    D-Dimer 1.57 (*)     All other components within normal limits   CBC W/ AUTO DIFFERENTIAL - Abnormal; Notable for the following components:    WBC 14.57 (*)     Hemoglobin 10.5 (*)     Hematocrit 33.8 (*)     Mean Corpuscular Hemoglobin 25.6 (*)     Mean Corpuscular Hemoglobin Conc 31.1 (*)     Immature Granulocytes 0.8 (*)     Gran # (ANC) 10.2  (*)     Immature Grans (Abs) 0.11 (*)     All other components within normal limits   COMPREHENSIVE METABOLIC PANEL - Abnormal; Notable for the following components:    Glucose 182 (*)     Albumin 3.4 (*)     All other components within normal limits   URINALYSIS, REFLEX TO URINE CULTURE - Abnormal; Notable for the following components:    Occult Blood UA 3+ (*)     All other components within normal limits    Narrative:     Specimen Source->Urine   ACETAMINOPHEN LEVEL - Abnormal; Notable for the following components:    Acetaminophen (Tylenol), Serum <3.0 (*)     All other components within normal limits   VALPROIC ACID - Abnormal; Notable for the following components:    Valproic Acid Level 35.6 (*)     All other components within normal limits   URINALYSIS MICROSCOPIC - Abnormal; Notable for the following components:    RBC, UA 20 (*)     All other components within normal limits    Narrative:     Specimen Source->Urine   TSH   DRUG SCREEN PANEL, URINE EMERGENCY    Narrative:     Specimen Source->Urine   ALCOHOL,MEDICAL (ETHANOL)   TROPONIN I   CK   PREGNANCY TEST, URINE RAPID    Narrative:     Specimen Source->Urine   HCG, QUANTITATIVE, PREGNANCY   HCG, QUANTITATIVE, PREGNANCY         EKG Readings: (Independently Interpreted)   Initial Reading: No STEMI. Rhythm: Normal Sinus Rhythm. Heart Rate: 96. Conduction: Normal. ST Segments: Non-Specific ST Segment Depression. Clinical Impression: Normal Sinus Rhythm Other Impression: Abnormal EKG        Imaging Results    None       Imaging Results          CTA Chest Non-Coronary (PE Study) (Final result)  Result time 10/07/20 17:35:16    Final result by Dat Lu MD (10/07/20 17:35:16)                 Impression:      No pulmonary thromboembolism.    Lungs are essentially clear.    All CT scans at this facility are performed  using dose modulation techniques as appropriate to performed exam including the following:  automated exposure control; adjustment of mA  and/or kV according to the patients size (this includes techniques or standardized protocols for targeted exams where dose is matched to indication/reason for exam: i.e. extremities or head);  iterative reconstruction technique.      Electronically signed by: Dat Lu  Date:    10/07/2020  Time:    17:35             Narrative:    EXAMINATION:  CTA CHEST NON CORONARY    CLINICAL HISTORY:  PE suspected, intermediate prob, positive D-dimer;    TECHNIQUE:  Low dose axial images, sagittal and coronal reformations were obtained from the thoracic inlet to the lung bases following the IV administration of 100 mL of Omnipaque 350.  Contrast timing was optimized to evaluate the pulmonary arteries.  MIP images were performed.    COMPARISON:  Chest radiograph 08/01/2020.    FINDINGS:  Base of Neck: No significant abnormality.    Thoracic soft tissues: Unremarkable.    Aorta: No aneurysm, atherosclerosis or dissection    Heart: Normal size. No effusion.    Pulmonary vasculature: Pulmonary arteries are well opacified.  There is no pulmonary thromboembolism.    Nila/Mediastinum: No pathologic tyrell enlargement.    Airways: Patent.    Lungs/Pleura: Clear lungs. No pleural effusion or thickening.  Minor bibasilar atelectasis.    Esophagus: Unremarkable.    Upper Abdomen: No abnormality of the partially imaged upper abdomen.    Bones: No acute fracture. No suspicious lytic or sclerotic lesions.                                   Medical Decision Making:   Clinical Tests:   Lab Tests: Ordered and Reviewed  The following lab test(s) were unremarkable: CBC, CMP, D-Dimer and Troponin       <> Summary of Lab: Elevated D-dimer at 1.57  Radiological Study: Ordered and Reviewed  Medical Tests: Ordered and Reviewed  ED Management:  The patient presented calm and cooperative complaining of weakness.  She was noted to have O2 saturations 93-94% on room air with no tachypnea or signs of respiratory distress.  Patient stated she did use  cocaine and smoked Mojo simultaneously prior to the hallucinatory behavior as well as her weakness.  She shows no signs of hallucinations or acute psychoses upon arrival at the bedside.  5:40 p.m. patient has had a very uneventful course in emergency department.  Her O2 saturations now are 100% on room air.  She has yet to give a urine specimen has been encouraged to do so for the record.  CT was done in lieu of her initial hypoxia, episode of weakness, and elevated D-dimer.  CTA was negative for pulmonary embolism.                             Clinical Impression:                  ICD-10-CM ICD-9-CM   1. Substance abuse  F19.10 305.90   2. Weakness  R53.1 780.79   3. Cocaine abuse  F14.10 305.60                      Jimy Mcduffie MD  10/08/20 0898

## 2021-04-28 ENCOUNTER — PATIENT MESSAGE (OUTPATIENT)
Dept: RESEARCH | Facility: HOSPITAL | Age: 36
End: 2021-04-28

## 2022-01-30 ENCOUNTER — HOSPITAL ENCOUNTER (EMERGENCY)
Facility: HOSPITAL | Age: 37
Discharge: PSYCHIATRIC OR SUBSTANCE USE FACILITY | End: 2022-01-30
Attending: EMERGENCY MEDICINE
Payer: MEDICAID

## 2022-01-30 VITALS
SYSTOLIC BLOOD PRESSURE: 132 MMHG | HEIGHT: 68 IN | TEMPERATURE: 98 F | OXYGEN SATURATION: 99 % | BODY MASS INDEX: 37.89 KG/M2 | HEART RATE: 88 BPM | WEIGHT: 250 LBS | DIASTOLIC BLOOD PRESSURE: 84 MMHG | RESPIRATION RATE: 16 BRPM

## 2022-01-30 DIAGNOSIS — F29 PSYCHOSIS, UNSPECIFIED PSYCHOSIS TYPE: Primary | ICD-10-CM

## 2022-01-30 LAB
ALBUMIN SERPL BCP-MCNC: 4.1 G/DL (ref 3.5–5.2)
ALP SERPL-CCNC: 79 U/L (ref 55–135)
ALT SERPL W/O P-5'-P-CCNC: 17 U/L (ref 10–44)
AMPHET+METHAMPHET UR QL: ABNORMAL
ANION GAP SERPL CALC-SCNC: 17 MMOL/L (ref 8–16)
APAP SERPL-MCNC: <3 UG/ML (ref 10–20)
AST SERPL-CCNC: 16 U/L (ref 10–40)
B-HCG UR QL: NEGATIVE
BACTERIA #/AREA URNS AUTO: ABNORMAL /HPF
BARBITURATES UR QL SCN>200 NG/ML: NEGATIVE
BASOPHILS # BLD AUTO: 0.02 K/UL (ref 0–0.2)
BASOPHILS NFR BLD: 0.2 % (ref 0–1.9)
BENZODIAZ UR QL SCN>200 NG/ML: NEGATIVE
BILIRUB SERPL-MCNC: 0.3 MG/DL (ref 0.1–1)
BILIRUB UR QL STRIP: NEGATIVE
BUN SERPL-MCNC: 15 MG/DL (ref 6–20)
BZE UR QL SCN: NEGATIVE
CALCIUM SERPL-MCNC: 9.1 MG/DL (ref 8.7–10.5)
CANNABINOIDS UR QL SCN: ABNORMAL
CHLORIDE SERPL-SCNC: 103 MMOL/L (ref 95–110)
CLARITY UR REFRACT.AUTO: ABNORMAL
CO2 SERPL-SCNC: 18 MMOL/L (ref 23–29)
COLOR UR AUTO: ABNORMAL
CREAT SERPL-MCNC: 0.9 MG/DL (ref 0.5–1.4)
CREAT UR-MCNC: 237.6 MG/DL (ref 15–325)
CTP QC/QA: YES
DIFFERENTIAL METHOD: ABNORMAL
EOSINOPHIL # BLD AUTO: 0 K/UL (ref 0–0.5)
EOSINOPHIL NFR BLD: 0.1 % (ref 0–8)
ERYTHROCYTE [DISTWIDTH] IN BLOOD BY AUTOMATED COUNT: 14 % (ref 11.5–14.5)
EST. GFR  (AFRICAN AMERICAN): >60 ML/MIN/1.73 M^2
EST. GFR  (NON AFRICAN AMERICAN): >60 ML/MIN/1.73 M^2
ETHANOL SERPL-MCNC: <10 MG/DL
GLUCOSE SERPL-MCNC: 244 MG/DL (ref 70–110)
GLUCOSE UR QL STRIP: NEGATIVE
HCT VFR BLD AUTO: 40.5 % (ref 37–48.5)
HGB BLD-MCNC: 12.8 G/DL (ref 12–16)
HGB UR QL STRIP: ABNORMAL
HYALINE CASTS UR QL AUTO: 0 /LPF
IMM GRANULOCYTES # BLD AUTO: 0.03 K/UL (ref 0–0.04)
IMM GRANULOCYTES NFR BLD AUTO: 0.3 % (ref 0–0.5)
KETONES UR QL STRIP: ABNORMAL
LEUKOCYTE ESTERASE UR QL STRIP: NEGATIVE
LYMPHOCYTES # BLD AUTO: 2.7 K/UL (ref 1–4.8)
LYMPHOCYTES NFR BLD: 26.6 % (ref 18–48)
MCH RBC QN AUTO: 24.3 PG (ref 27–31)
MCHC RBC AUTO-ENTMCNC: 31.6 G/DL (ref 32–36)
MCV RBC AUTO: 77 FL (ref 82–98)
METHADONE UR QL SCN>300 NG/ML: NEGATIVE
MICROSCOPIC COMMENT: ABNORMAL
MONOCYTES # BLD AUTO: 0.6 K/UL (ref 0.3–1)
MONOCYTES NFR BLD: 6 % (ref 4–15)
NEUTROPHILS # BLD AUTO: 6.9 K/UL (ref 1.8–7.7)
NEUTROPHILS NFR BLD: 66.8 % (ref 38–73)
NITRITE UR QL STRIP: NEGATIVE
NRBC BLD-RTO: 0 /100 WBC
OPIATES UR QL SCN: NEGATIVE
PCP UR QL SCN>25 NG/ML: NEGATIVE
PH UR STRIP: 6 [PH] (ref 5–8)
PLATELET # BLD AUTO: 270 K/UL (ref 150–450)
PMV BLD AUTO: 9.8 FL (ref 9.2–12.9)
POCT GLUCOSE: 187 MG/DL (ref 70–110)
POCT GLUCOSE: 209 MG/DL (ref 70–110)
POCT GLUCOSE: 242 MG/DL (ref 70–110)
POTASSIUM SERPL-SCNC: 3.4 MMOL/L (ref 3.5–5.1)
PROT SERPL-MCNC: 8.2 G/DL (ref 6–8.4)
PROT UR QL STRIP: ABNORMAL
RBC # BLD AUTO: 5.26 M/UL (ref 4–5.4)
RBC #/AREA URNS AUTO: 50 /HPF (ref 0–4)
SARS-COV-2 RDRP RESP QL NAA+PROBE: NEGATIVE
SODIUM SERPL-SCNC: 138 MMOL/L (ref 136–145)
SP GR UR STRIP: >=1.03 (ref 1–1.03)
TOXICOLOGY INFORMATION: ABNORMAL
TSH SERPL DL<=0.005 MIU/L-ACNC: 1.05 UIU/ML (ref 0.4–4)
URN SPEC COLLECT METH UR: ABNORMAL
UROBILINOGEN UR STRIP-ACNC: NEGATIVE EU/DL
WBC # BLD AUTO: 10.31 K/UL (ref 3.9–12.7)
WBC #/AREA URNS AUTO: 4 /HPF (ref 0–5)

## 2022-01-30 PROCEDURE — 85025 COMPLETE CBC W/AUTO DIFF WBC: CPT | Mod: ER | Performed by: EMERGENCY MEDICINE

## 2022-01-30 PROCEDURE — 81000 URINALYSIS NONAUTO W/SCOPE: CPT | Mod: ER,59 | Performed by: EMERGENCY MEDICINE

## 2022-01-30 PROCEDURE — 84443 ASSAY THYROID STIM HORMONE: CPT | Mod: ER | Performed by: EMERGENCY MEDICINE

## 2022-01-30 PROCEDURE — 96361 HYDRATE IV INFUSION ADD-ON: CPT | Mod: ER

## 2022-01-30 PROCEDURE — U0002 COVID-19 LAB TEST NON-CDC: HCPCS | Mod: ER | Performed by: EMERGENCY MEDICINE

## 2022-01-30 PROCEDURE — 25000003 PHARM REV CODE 250: Mod: ER | Performed by: EMERGENCY MEDICINE

## 2022-01-30 PROCEDURE — 96360 HYDRATION IV INFUSION INIT: CPT | Mod: ER

## 2022-01-30 PROCEDURE — 80053 COMPREHEN METABOLIC PANEL: CPT | Mod: ER | Performed by: EMERGENCY MEDICINE

## 2022-01-30 PROCEDURE — 63600175 PHARM REV CODE 636 W HCPCS: Mod: ER

## 2022-01-30 PROCEDURE — 82077 ASSAY SPEC XCP UR&BREATH IA: CPT | Mod: ER | Performed by: EMERGENCY MEDICINE

## 2022-01-30 PROCEDURE — 99285 EMERGENCY DEPT VISIT HI MDM: CPT | Mod: 25,ER

## 2022-01-30 PROCEDURE — 80307 DRUG TEST PRSMV CHEM ANLYZR: CPT | Mod: ER | Performed by: EMERGENCY MEDICINE

## 2022-01-30 PROCEDURE — 96372 THER/PROPH/DIAG INJ SC/IM: CPT | Mod: 59,ER

## 2022-01-30 PROCEDURE — 81025 URINE PREGNANCY TEST: CPT | Mod: ER | Performed by: EMERGENCY MEDICINE

## 2022-01-30 PROCEDURE — 80143 DRUG ASSAY ACETAMINOPHEN: CPT | Mod: ER | Performed by: EMERGENCY MEDICINE

## 2022-01-30 PROCEDURE — 82962 GLUCOSE BLOOD TEST: CPT | Mod: ER

## 2022-01-30 RX ORDER — INSULIN ASPART 100 [IU]/ML
0-5 INJECTION, SOLUTION INTRAVENOUS; SUBCUTANEOUS
Status: DISCONTINUED | OUTPATIENT
Start: 2022-01-30 | End: 2022-01-30 | Stop reason: HOSPADM

## 2022-01-30 RX ORDER — IBUPROFEN 200 MG
24 TABLET ORAL
Status: DISCONTINUED | OUTPATIENT
Start: 2022-01-30 | End: 2022-01-30 | Stop reason: HOSPADM

## 2022-01-30 RX ORDER — GLUCAGON 1 MG
1 KIT INJECTION
Status: DISCONTINUED | OUTPATIENT
Start: 2022-01-30 | End: 2022-01-30 | Stop reason: HOSPADM

## 2022-01-30 RX ORDER — HALOPERIDOL 5 MG/ML
10 INJECTION INTRAMUSCULAR
Status: COMPLETED | OUTPATIENT
Start: 2022-01-30 | End: 2022-01-30

## 2022-01-30 RX ORDER — HALOPERIDOL 5 MG/ML
INJECTION INTRAMUSCULAR
Status: COMPLETED
Start: 2022-01-30 | End: 2022-01-30

## 2022-01-30 RX ORDER — DULAGLUTIDE 0.75 MG/.5ML
0.75 INJECTION, SOLUTION SUBCUTANEOUS
Status: ON HOLD | COMMUNITY
Start: 2022-01-28 | End: 2022-02-04 | Stop reason: HOSPADM

## 2022-01-30 RX ORDER — INSULIN GLARGINE 100 [IU]/ML
INJECTION, SOLUTION SUBCUTANEOUS
Status: ON HOLD | COMMUNITY
Start: 2022-01-27 | End: 2022-02-04 | Stop reason: HOSPADM

## 2022-01-30 RX ORDER — IBUPROFEN 200 MG
16 TABLET ORAL
Status: DISCONTINUED | OUTPATIENT
Start: 2022-01-30 | End: 2022-01-30 | Stop reason: HOSPADM

## 2022-01-30 RX ORDER — IBUPROFEN 800 MG/1
800 TABLET ORAL EVERY 6 HOURS PRN
COMMUNITY
Start: 2021-10-24 | End: 2022-02-11

## 2022-01-30 RX ORDER — ATORVASTATIN CALCIUM 20 MG/1
20 TABLET, FILM COATED ORAL NIGHTLY
Status: ON HOLD | COMMUNITY
Start: 2022-01-27 | End: 2022-02-04 | Stop reason: HOSPADM

## 2022-01-30 RX ORDER — PREDNISONE 10 MG/1
TABLET ORAL
Status: ON HOLD | COMMUNITY
Start: 2022-01-13 | End: 2022-02-04 | Stop reason: HOSPADM

## 2022-01-30 RX ADMIN — HALOPERIDOL 10 MG: 5 INJECTION INTRAMUSCULAR at 03:01

## 2022-01-30 RX ADMIN — HALOPERIDOL LACTATE 10 MG: 5 INJECTION, SOLUTION INTRAMUSCULAR at 03:01

## 2022-01-30 RX ADMIN — SODIUM CHLORIDE 1000 ML: 0.9 INJECTION, SOLUTION INTRAVENOUS at 06:01

## 2022-01-30 NOTE — ED NOTES
Patient wanded by security. Patient changed into blue scrubs at this time. Sitter at bedside w/ patient. Patient's personal belongings collected and checked by CARLEEN Reno at this time.     1 black and white coat  1 sweater  1 pink tank top  1 black case w/ onetouch insulin monitor and onetouch test strips   1 purse w/ mail, phone , pens, mask,and small purse inside  Pair of red shoes  1 ankle bracelet    Patient's personal belonging searched and place in locker 3 code [id number]
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
## 2022-01-31 PROBLEM — E87.6 HYPOKALEMIA: Status: ACTIVE | Noted: 2022-01-31

## 2022-01-31 PROBLEM — E11.65 TYPE 2 DIABETES MELLITUS WITH HYPERGLYCEMIA: Status: ACTIVE | Noted: 2022-01-31

## 2022-01-31 PROBLEM — E78.5 HYPERLIPIDEMIA: Chronic | Status: ACTIVE | Noted: 2022-01-31

## 2022-02-11 ENCOUNTER — HOSPITAL ENCOUNTER (EMERGENCY)
Facility: HOSPITAL | Age: 37
Discharge: HOME | End: 2022-02-11
Attending: EMERGENCY MEDICINE
Payer: MEDICAID

## 2022-02-11 VITALS
OXYGEN SATURATION: 100 % | HEART RATE: 89 BPM | SYSTOLIC BLOOD PRESSURE: 128 MMHG | BODY MASS INDEX: 37.98 KG/M2 | TEMPERATURE: 98 F | DIASTOLIC BLOOD PRESSURE: 81 MMHG | RESPIRATION RATE: 18 BRPM | WEIGHT: 242.5 LBS

## 2022-02-11 DIAGNOSIS — N61.1 ABSCESS OF BREAST: Primary | ICD-10-CM

## 2022-02-11 PROCEDURE — 25000003 PHARM REV CODE 250: Mod: ER | Performed by: PHYSICIAN ASSISTANT

## 2022-02-11 PROCEDURE — 99284 EMERGENCY DEPT VISIT MOD MDM: CPT | Mod: 25,ER

## 2022-02-11 PROCEDURE — 10060 I&D ABSCESS SIMPLE/SINGLE: CPT | Mod: ER

## 2022-02-11 RX ORDER — IBUPROFEN 600 MG/1
600 TABLET ORAL EVERY 6 HOURS PRN
Qty: 12 TABLET | Refills: 0 | Status: ON HOLD | OUTPATIENT
Start: 2022-02-11 | End: 2022-12-13 | Stop reason: HOSPADM

## 2022-02-11 RX ORDER — IBUPROFEN 200 MG
600 TABLET ORAL
Status: COMPLETED | OUTPATIENT
Start: 2022-02-11 | End: 2022-02-11

## 2022-02-11 RX ORDER — SULFAMETHOXAZOLE AND TRIMETHOPRIM 800; 160 MG/1; MG/1
1 TABLET ORAL 2 TIMES DAILY
Qty: 14 TABLET | Refills: 0 | Status: SHIPPED | OUTPATIENT
Start: 2022-02-11 | End: 2022-02-18

## 2022-02-11 RX ORDER — SULFAMETHOXAZOLE AND TRIMETHOPRIM 800; 160 MG/1; MG/1
1 TABLET ORAL
Status: COMPLETED | OUTPATIENT
Start: 2022-02-11 | End: 2022-02-11

## 2022-02-11 RX ORDER — HYDROCODONE BITARTRATE AND ACETAMINOPHEN 5; 325 MG/1; MG/1
1 TABLET ORAL
Status: COMPLETED | OUTPATIENT
Start: 2022-02-11 | End: 2022-02-11

## 2022-02-11 RX ADMIN — SULFAMETHOXAZOLE AND TRIMETHOPRIM 1 TABLET: 800; 160 TABLET ORAL at 06:02

## 2022-02-11 RX ADMIN — IBUPROFEN 600 MG: 200 TABLET, FILM COATED ORAL at 06:02

## 2022-02-11 RX ADMIN — LIDOCAINE HYDROCHLORIDE 10 ML: 10; .005 INJECTION, SOLUTION EPIDURAL; INFILTRATION; INTRACAUDAL; PERINEURAL at 06:02

## 2022-02-11 RX ADMIN — HYDROCODONE BITARTRATE AND ACETAMINOPHEN 1 TABLET: 5; 325 TABLET ORAL at 06:02

## 2022-02-12 NOTE — ED PROVIDER NOTES
"   History      Chief Complaint   Patient presents with    Abscess     Abscess on right breast x 3 days. Tried to pop it herself yesterday       Review of patient's allergies indicates:  No Known Allergies     HPI   HPI    2022, 6:01 PM   History obtained from the patient      History of Present Illness: Ashley Powers is a 36 y.o. female patient who presents to the Emergency Department for painful abscess to right breast for 3 days.  Denies f/n/v.  Symptoms are constant and moderate in severity.  No further complaints or concerns at this time.           PCP: [facility]       Past Medical History:  Past Medical History:   Diagnosis Date    Bipolar disorder     Schizophrenia          Past Surgical History:  Past Surgical History:   Procedure Laterality Date     SECTION      CHOLECYSTECTOMY      TUBAL LIGATION             Family History:  No family history on file.        Social History:  Social History     Tobacco Use    Smoking status: Current Some Day Smoker     Types: Cigarettes    Smokeless tobacco: Never Used   Substance and Sexual Activity    Alcohol use: Yes     Comment: occasionally     Drug use: Yes     Types: "Crack" cocaine, Codeine, Heroin, Fentanyl, Marijuana     Comment: heronin, fentanyl, crack cocaine, weed - last used this morning    Sexual activity: Yes       ROS   Review of Systems   Constitutional: Negative for activity change and appetite change.   HENT: Negative for sore throat.    Respiratory: Negative for shortness of breath.    Cardiovascular: Negative for chest pain.   Gastrointestinal: Negative for nausea and vomiting.   Genitourinary: Negative for dysuria.   Musculoskeletal: Negative for back pain and neck pain.   Skin: Positive for abscess.  Negative for rash.   Neurological: Negative for weakness.   Hematological: Does not bruise/bleed easily.   All other systems reviewed and are negative.      Review of Systems    Physical Exam      Initial Vitals " [02/11/22 1743]   BP Pulse Resp Temp SpO2   128/81 89 16 98.2 °F (36.8 °C) 100 %      MAP       --         Physical Exam  Vital signs and nursing notes reviewed.  Constitutional: Patient is in NAD. Awake and alert. Well-developed and well-nourished.  Head: Atraumatic. Normocephalic.  Eyes: PERRL. EOM intact. Conjunctivae nl. No scleral icterus.  ENT: Mucous membranes are moist. Oropharynx is clear.  Neck: Supple. No JVD. No lymphadenopathy.  No meningismus  Cardiovascular: Regular rate and rhythm. No murmurs, rubs, or gallops. Distal pulses are 2+ and symmetric.  Pulmonary/Chest: No respiratory distress. Clear to auscultation bilaterally. No wheezing, rales, or rhonchi.  Abdominal: Soft. Non-distended. No TTP. No rebound, guarding, or rigidity. Good bowel sounds.  Genitourinary: No CVA tenderness  Musculoskeletal: Moves all extremities. No edema.   Skin: Warm and dry.   4 cm of erythema with central fluctuance to right lower breast  Neurological: Awake and alert. No acute focal neurological deficits are appreciated.  Psychiatric: Normal affect. Good eye contact. Appropriate in content.      ED Course      I & D - Incision and Drainage    Date/Time: 2/11/2022 12:37 PM  Location procedure was performed: [facility] EMERGENCY DEPARTMENT  Performed by: Gaby Roach PA-C  Authorized by: Jimy Mcduffie MD   Type: abscess  Body area: trunk  Location details: right breast  Anesthesia: local infiltration    Anesthesia:  Local Anesthetic: lidocaine 1% with epinephrine  Scalpel size: 11  Incision type: single straight  Complexity: simple  Drainage: pus  Drainage amount: copious  Wound treatment: deloculation and  wound packed  Packing material: 1/2 in gauze  Patient tolerance: Patient tolerated the procedure well with no immediate complications        ED Vital Signs:  Vitals:    02/11/22 1743 02/11/22 1808   BP: 128/81    Pulse: 89    Resp: 16 18   Temp: 98.2 °F (36.8 °C)    TempSrc: Oral    SpO2: 100%    Weight: 110 kg (242 lb  8.1 oz)                  Imaging Results:  Imaging Results    None            All findings were reviewed with the patient/family in detail.   All remaining questions and concerns were addressed at that time.  Patient/family has been counseled regarding the need for follow-up as well as the indication to return to the emergency room should new or worrisome developments occur.      ED Discussion               Medication(s) given in the ER:  Medications   LIDOcaine-EPINEPHrine (PF) 1%-1:200,000 injection 10 mL (10 mLs Intradermal Given 2/11/22 1810)   sulfamethoxazole-trimethoprim 800-160mg per tablet 1 tablet (1 tablet Oral Given 2/11/22 1808)   HYDROcodone-acetaminophen 5-325 mg per tablet 1 tablet (1 tablet Oral Given 2/11/22 1808)   ibuprofen tablet 600 mg (600 mg Oral Given 2/11/22 1808)            Follow-up Information     Your Primary Care Doctor In 2 days.                              Medication List      START taking these medications    sulfamethoxazole-trimethoprim 800-160mg 800-160 mg Tab  Commonly known as: BACTRIM DS  Take 1 tablet by mouth 2 (two) times daily. for 7 days        CHANGE how you take these medications    ibuprofen 600 MG tablet  Commonly known as: ADVIL,MOTRIN  Take 1 tablet (600 mg total) by mouth every 6 (six) hours as needed for Pain.  What changed:   · medication strength  · how much to take  · reasons to take this        ASK your doctor about these medications    atorvastatin 20 MG tablet  Commonly known as: LIPITOR  Take 1 tablet (20 mg total) by mouth every evening.     divalproex 250 MG EC tablet  Commonly known as: DEPAKOTE  Take 1 tablet (250 mg total) by mouth every 8 (eight) hours.     metFORMIN 500 MG tablet  Commonly known as: GLUCOPHAGE  Take 1 tablet (500 mg total) by mouth 2 (two) times daily with meals.     naloxone 1 mg/mL injection  Commonly known as: NARCAN  2 mg (1 mg per nostril) by Nasal route as needed for opioid overdose; may repeat in 3 to 5 minutes if not  effective. Call 911     QUEtiapine 200 MG Tab  Commonly known as: SEROQUEL  Take 1 tablet (200 mg total) by mouth every evening.           Where to Get Your Medications      These medications were sent to [email or web address] [place] - [address] [facility]  [address] [facility] [place] [address]    Phone: [phone]   · ibuprofen 600 MG tablet  · sulfamethoxazole-trimethoprim 800-160mg 800-160 mg Tab             Medical Decision Making        MDM               Clinical Impression:        ICD-10-CM ICD-9-CM   1. Abscess of breast  N61.1 611.0              Disposition  Stable  Discharged       Gaby Roach PA-C  02/12/22 9922

## 2022-06-09 ENCOUNTER — HOSPITAL ENCOUNTER (EMERGENCY)
Facility: HOSPITAL | Age: 37
Discharge: HOME | End: 2022-06-09
Attending: EMERGENCY MEDICINE
Payer: MEDICAID

## 2022-06-09 VITALS
BODY MASS INDEX: 35.27 KG/M2 | HEART RATE: 72 BPM | OXYGEN SATURATION: 100 % | RESPIRATION RATE: 19 BRPM | DIASTOLIC BLOOD PRESSURE: 72 MMHG | SYSTOLIC BLOOD PRESSURE: 128 MMHG | TEMPERATURE: 98 F | WEIGHT: 225.19 LBS

## 2022-06-09 DIAGNOSIS — Z20.822 COVID-19 VIRUS NOT DETECTED: Primary | ICD-10-CM

## 2022-06-09 DIAGNOSIS — J06.9 VIRAL URI WITH COUGH: ICD-10-CM

## 2022-06-09 LAB
CTP QC/QA: YES
SARS-COV-2 RDRP RESP QL NAA+PROBE: NEGATIVE

## 2022-06-09 PROCEDURE — U0002 COVID-19 LAB TEST NON-CDC: HCPCS | Mod: ER | Performed by: EMERGENCY MEDICINE

## 2022-06-09 PROCEDURE — 99282 EMERGENCY DEPT VISIT SF MDM: CPT | Mod: 25,ER

## 2022-06-10 NOTE — ED PROVIDER NOTES
"Encounter Date: 2022       History     Chief Complaint   Patient presents with    COVID-19 Concerns     Pt needs covid test to go to rehab facility. Has been coughing a couple of days     Ashley Powers is a 36 y.o. female who presents with gradual onset, moderate, constant viral symptoms at home including associated cough x2-3 days at home. No other complaints. She wants to go to a rehab facility and they require COVID-19 testing for acceptance.         Review of patient's allergies indicates:  No Known Allergies  Past Medical History:   Diagnosis Date    Bipolar disorder     Schizophrenia      Past Surgical History:   Procedure Laterality Date     SECTION      CHOLECYSTECTOMY      TUBAL LIGATION       No family history on file.  Social History     Tobacco Use    Smoking status: Current Some Day Smoker     Types: Cigarettes    Smokeless tobacco: Never Used   Substance Use Topics    Alcohol use: Yes     Comment: occasionally     Drug use: Yes     Types: "Crack" cocaine, Codeine, Heroin, Fentanyl, Marijuana     Comment: heronin, fentanyl, crack cocaine, weed - last used this morning     Review of Systems   Constitutional: Negative.  Negative for fever.   HENT: Negative.    Eyes: Negative.    Respiratory: Positive for cough. Negative for shortness of breath.    Cardiovascular: Negative.    Gastrointestinal: Negative.  Negative for diarrhea and vomiting.   Endocrine: Negative.    Genitourinary: Negative.    Musculoskeletal: Negative.    Skin: Negative.    Allergic/Immunologic: Negative.    Neurological: Negative.    Hematological: Negative.    Psychiatric/Behavioral: Negative.    All other systems reviewed and are negative.      Physical Exam     Initial Vitals [22 1907]   BP Pulse Resp Temp SpO2   130/79 78 16 98.1 °F (36.7 °C) 99 %      MAP       --         Physical Exam    Nursing note and vitals reviewed.  Constitutional: She appears well-developed and well-nourished. No distress.   HENT: "   Head: Normocephalic and atraumatic.   Eyes: Conjunctivae and EOM are normal. Pupils are equal, round, and reactive to light.   Neck: Neck supple.   Cardiovascular: Normal rate, regular rhythm, normal heart sounds and intact distal pulses.   Pulmonary/Chest: Breath sounds normal. No respiratory distress.   Abdominal: Abdomen is soft. She exhibits no distension. There is no abdominal tenderness.   Musculoskeletal:         General: Normal range of motion.      Cervical back: Neck supple.     Neurological: She is alert and oriented to person, place, and time. She has normal strength.   Skin: Skin is warm and dry. Capillary refill takes less than 2 seconds.   Psychiatric: She has a normal mood and affect.         ED Course   Procedures  Labs Reviewed   SARS-COV-2 RDRP GENE          Imaging Results    None          Medications - No data to display         Recent Results (from the past 24 hour(s))   POCT COVID-19 Rapid Screening    Collection Time: 06/09/22  7:17 PM   Result Value Ref Range    POC Rapid COVID Negative Negative     Acceptable Yes        Patient's evaluation in the ED does not suggest any emergent or life threatening medical conditions requiring immediate intervention beyond what was provided in the ED, and I believe patient is safe for discharge.  Regardless, an unremarkable evaluation in the ED does not preclude the development or presence of a serious or life threatening condition. As such, patient was given return instructions for any change or worsening in symptoms.                   Clinical Impression:   Final diagnoses:  [Z20.822] COVID-19 virus not detected (Primary)  [J06.9] Viral URI with cough          ED Disposition Condition    Discharge Stable        ED Prescriptions     None        Follow-up Information     Follow up With Specialties Details Why Contact Info    with your pcp  Schedule an appointment as soon as possible for a visit  As needed     [facility] Emergency Dept  Emergency Medicine  As needed, If symptoms worsen [id number] [address]  [facility] [id number] [phone]           Edgar Santiago MD  06/10/22 0125

## 2022-12-05 ENCOUNTER — HOSPITAL ENCOUNTER (EMERGENCY)
Facility: HOSPITAL | Age: 37
Discharge: LEFT BEFORE DISCHARGE | End: 2022-12-05
Attending: EMERGENCY MEDICINE
Payer: MEDICAID

## 2022-12-05 ENCOUNTER — HOSPITAL ENCOUNTER (EMERGENCY)
Facility: HOSPITAL | Age: 37
Discharge: PSYCHIATRIC OR SUBSTANCE USE FACILITY | End: 2022-12-05
Attending: EMERGENCY MEDICINE
Payer: MEDICAID

## 2022-12-05 VITALS
OXYGEN SATURATION: 100 % | TEMPERATURE: 98 F | HEART RATE: 92 BPM | WEIGHT: 214 LBS | SYSTOLIC BLOOD PRESSURE: 163 MMHG | RESPIRATION RATE: 15 BRPM | BODY MASS INDEX: 33.52 KG/M2 | DIASTOLIC BLOOD PRESSURE: 100 MMHG

## 2022-12-05 VITALS
SYSTOLIC BLOOD PRESSURE: 172 MMHG | OXYGEN SATURATION: 98 % | TEMPERATURE: 98 F | BODY MASS INDEX: 33.56 KG/M2 | DIASTOLIC BLOOD PRESSURE: 118 MMHG | WEIGHT: 214.31 LBS | HEART RATE: 118 BPM | RESPIRATION RATE: 18 BRPM

## 2022-12-05 DIAGNOSIS — F19.10 POLYSUBSTANCE ABUSE: ICD-10-CM

## 2022-12-05 DIAGNOSIS — R45.851 SUICIDAL IDEATIONS: Primary | ICD-10-CM

## 2022-12-05 DIAGNOSIS — F20.1 DISORGANIZED SCHIZOPHRENIA: Primary | ICD-10-CM

## 2022-12-05 DIAGNOSIS — R31.9 URINARY TRACT INFECTION WITH HEMATURIA, SITE UNSPECIFIED: ICD-10-CM

## 2022-12-05 DIAGNOSIS — N39.0 URINARY TRACT INFECTION WITH HEMATURIA, SITE UNSPECIFIED: ICD-10-CM

## 2022-12-05 DIAGNOSIS — Z00.8 MEDICAL CLEARANCE FOR PSYCHIATRIC ADMISSION: ICD-10-CM

## 2022-12-05 LAB
ALBUMIN SERPL BCP-MCNC: 4 G/DL (ref 3.5–5.2)
ALP SERPL-CCNC: 61 U/L (ref 55–135)
ALT SERPL W/O P-5'-P-CCNC: 33 U/L (ref 10–44)
AMPHET+METHAMPHET UR QL: ABNORMAL
ANION GAP SERPL CALC-SCNC: 10 MMOL/L (ref 8–16)
APAP SERPL-MCNC: <3 UG/ML (ref 10–20)
AST SERPL-CCNC: 29 U/L (ref 10–40)
B-HCG UR QL: NEGATIVE
BACTERIA #/AREA URNS AUTO: ABNORMAL /HPF
BARBITURATES UR QL SCN>200 NG/ML: NEGATIVE
BASOPHILS # BLD AUTO: 0.01 K/UL (ref 0–0.2)
BASOPHILS NFR BLD: 0.1 % (ref 0–1.9)
BENZODIAZ UR QL SCN>200 NG/ML: NEGATIVE
BILIRUB SERPL-MCNC: 0.6 MG/DL (ref 0.1–1)
BILIRUB UR QL STRIP: ABNORMAL
BUN SERPL-MCNC: 11 MG/DL (ref 6–20)
BZE UR QL SCN: ABNORMAL
CALCIUM SERPL-MCNC: 9.4 MG/DL (ref 8.7–10.5)
CANNABINOIDS UR QL SCN: ABNORMAL
CHLORIDE SERPL-SCNC: 105 MMOL/L (ref 95–110)
CLARITY UR REFRACT.AUTO: ABNORMAL
CO2 SERPL-SCNC: 23 MMOL/L (ref 23–29)
COLOR UR AUTO: ABNORMAL
CREAT SERPL-MCNC: 0.9 MG/DL (ref 0.5–1.4)
CREAT UR-MCNC: >450 MG/DL (ref 15–325)
CTP QC/QA: YES
DIFFERENTIAL METHOD: ABNORMAL
EOSINOPHIL # BLD AUTO: 0.1 K/UL (ref 0–0.5)
EOSINOPHIL NFR BLD: 0.9 % (ref 0–8)
ERYTHROCYTE [DISTWIDTH] IN BLOOD BY AUTOMATED COUNT: 14.1 % (ref 11.5–14.5)
EST. GFR  (NO RACE VARIABLE): >60 ML/MIN/1.73 M^2
ETHANOL SERPL-MCNC: <10 MG/DL
GLUCOSE SERPL-MCNC: 191 MG/DL (ref 70–110)
GLUCOSE UR QL STRIP: NEGATIVE
HCT VFR BLD AUTO: 37.9 % (ref 37–48.5)
HEP C VIRUS HOLD SPECIMEN: NORMAL
HGB BLD-MCNC: 11.9 G/DL (ref 12–16)
HGB UR QL STRIP: ABNORMAL
HYALINE CASTS UR QL AUTO: 0 /LPF
IMM GRANULOCYTES # BLD AUTO: 0.02 K/UL (ref 0–0.04)
IMM GRANULOCYTES NFR BLD AUTO: 0.3 % (ref 0–0.5)
KETONES UR QL STRIP: ABNORMAL
LEUKOCYTE ESTERASE UR QL STRIP: ABNORMAL
LYMPHOCYTES # BLD AUTO: 2.6 K/UL (ref 1–4.8)
LYMPHOCYTES NFR BLD: 36.3 % (ref 18–48)
MCH RBC QN AUTO: 24.8 PG (ref 27–31)
MCHC RBC AUTO-ENTMCNC: 31.4 G/DL (ref 32–36)
MCV RBC AUTO: 79 FL (ref 82–98)
METHADONE UR QL SCN>300 NG/ML: NEGATIVE
MICROSCOPIC COMMENT: ABNORMAL
MONOCYTES # BLD AUTO: 0.5 K/UL (ref 0.3–1)
MONOCYTES NFR BLD: 6.8 % (ref 4–15)
NEUTROPHILS # BLD AUTO: 3.9 K/UL (ref 1.8–7.7)
NEUTROPHILS NFR BLD: 55.6 % (ref 38–73)
NITRITE UR QL STRIP: POSITIVE
NRBC BLD-RTO: 0 /100 WBC
OPIATES UR QL SCN: NEGATIVE
PCP UR QL SCN>25 NG/ML: NEGATIVE
PH UR STRIP: 6 [PH] (ref 5–8)
PLATELET # BLD AUTO: 245 K/UL (ref 150–450)
PMV BLD AUTO: 10.2 FL (ref 9.2–12.9)
POTASSIUM SERPL-SCNC: 3.7 MMOL/L (ref 3.5–5.1)
PROT SERPL-MCNC: 8.2 G/DL (ref 6–8.4)
PROT UR QL STRIP: ABNORMAL
RBC # BLD AUTO: 4.79 M/UL (ref 4–5.4)
RBC #/AREA URNS AUTO: >100 /HPF (ref 0–4)
SALICYLATES SERPL-MCNC: <5 MG/DL (ref 15–30)
SARS-COV-2 RDRP RESP QL NAA+PROBE: NEGATIVE
SODIUM SERPL-SCNC: 138 MMOL/L (ref 136–145)
SP GR UR STRIP: >1.03 (ref 1–1.03)
SQUAMOUS #/AREA URNS AUTO: 2 /HPF
TOXICOLOGY INFORMATION: ABNORMAL
TSH SERPL DL<=0.005 MIU/L-ACNC: 0.56 UIU/ML (ref 0.4–4)
URN SPEC COLLECT METH UR: ABNORMAL
UROBILINOGEN UR STRIP-ACNC: NEGATIVE EU/DL
WBC # BLD AUTO: 7.02 K/UL (ref 3.9–12.7)
WBC #/AREA URNS AUTO: 15 /HPF (ref 0–5)

## 2022-12-05 PROCEDURE — 87088 URINE BACTERIA CULTURE: CPT | Performed by: EMERGENCY MEDICINE

## 2022-12-05 PROCEDURE — 93010 EKG 12-LEAD: ICD-10-PCS | Mod: ,,, | Performed by: INTERNAL MEDICINE

## 2022-12-05 PROCEDURE — 80053 COMPREHEN METABOLIC PANEL: CPT | Mod: ER | Performed by: EMERGENCY MEDICINE

## 2022-12-05 PROCEDURE — 84443 ASSAY THYROID STIM HORMONE: CPT | Mod: ER | Performed by: EMERGENCY MEDICINE

## 2022-12-05 PROCEDURE — 87086 URINE CULTURE/COLONY COUNT: CPT | Performed by: EMERGENCY MEDICINE

## 2022-12-05 PROCEDURE — 82077 ASSAY SPEC XCP UR&BREATH IA: CPT | Mod: ER | Performed by: EMERGENCY MEDICINE

## 2022-12-05 PROCEDURE — 99284 EMERGENCY DEPT VISIT MOD MDM: CPT | Mod: ER

## 2022-12-05 PROCEDURE — 87635 SARS-COV-2 COVID-19 AMP PRB: CPT | Mod: ER | Performed by: EMERGENCY MEDICINE

## 2022-12-05 PROCEDURE — 87077 CULTURE AEROBIC IDENTIFY: CPT | Performed by: EMERGENCY MEDICINE

## 2022-12-05 PROCEDURE — 87186 SC STD MICRODIL/AGAR DIL: CPT | Performed by: EMERGENCY MEDICINE

## 2022-12-05 PROCEDURE — 81025 URINE PREGNANCY TEST: CPT | Mod: ER | Performed by: EMERGENCY MEDICINE

## 2022-12-05 PROCEDURE — 86803 HEPATITIS C AB TEST: CPT | Performed by: EMERGENCY MEDICINE

## 2022-12-05 PROCEDURE — 80307 DRUG TEST PRSMV CHEM ANLYZR: CPT | Mod: ER | Performed by: EMERGENCY MEDICINE

## 2022-12-05 PROCEDURE — 93010 ELECTROCARDIOGRAM REPORT: CPT | Mod: ,,, | Performed by: INTERNAL MEDICINE

## 2022-12-05 PROCEDURE — 63600175 PHARM REV CODE 636 W HCPCS: Mod: ER | Performed by: EMERGENCY MEDICINE

## 2022-12-05 PROCEDURE — 25000003 PHARM REV CODE 250: Mod: ER | Performed by: EMERGENCY MEDICINE

## 2022-12-05 PROCEDURE — 80179 DRUG ASSAY SALICYLATE: CPT | Mod: ER | Performed by: EMERGENCY MEDICINE

## 2022-12-05 PROCEDURE — 85025 COMPLETE CBC W/AUTO DIFF WBC: CPT | Mod: ER | Performed by: EMERGENCY MEDICINE

## 2022-12-05 PROCEDURE — 87389 HIV-1 AG W/HIV-1&-2 AB AG IA: CPT | Performed by: EMERGENCY MEDICINE

## 2022-12-05 PROCEDURE — 93005 ELECTROCARDIOGRAM TRACING: CPT | Mod: ER

## 2022-12-05 PROCEDURE — 96372 THER/PROPH/DIAG INJ SC/IM: CPT | Performed by: EMERGENCY MEDICINE

## 2022-12-05 PROCEDURE — 80143 DRUG ASSAY ACETAMINOPHEN: CPT | Mod: ER | Performed by: EMERGENCY MEDICINE

## 2022-12-05 PROCEDURE — 81000 URINALYSIS NONAUTO W/SCOPE: CPT | Mod: 59,ER | Performed by: EMERGENCY MEDICINE

## 2022-12-05 PROCEDURE — 99285 EMERGENCY DEPT VISIT HI MDM: CPT | Mod: 27,ER

## 2022-12-05 RX ORDER — BLOOD-GLUCOSE METER
1 EACH MISCELLANEOUS 3 TIMES DAILY
Status: ON HOLD | COMMUNITY
Start: 2022-08-31 | End: 2022-12-13 | Stop reason: HOSPADM

## 2022-12-05 RX ORDER — NITROFURANTOIN 25; 75 MG/1; MG/1
100 CAPSULE ORAL EVERY 12 HOURS
Status: DISCONTINUED | OUTPATIENT
Start: 2022-12-05 | End: 2022-12-05

## 2022-12-05 RX ORDER — QUETIAPINE FUMARATE 200 MG/1
200 TABLET, FILM COATED ORAL NIGHTLY
Qty: 30 TABLET | Refills: 0 | Status: ON HOLD | OUTPATIENT
Start: 2022-12-05 | End: 2022-12-13 | Stop reason: HOSPADM

## 2022-12-05 RX ORDER — ZIPRASIDONE MESYLATE 20 MG/ML
20 INJECTION, POWDER, LYOPHILIZED, FOR SOLUTION INTRAMUSCULAR
Status: COMPLETED | OUTPATIENT
Start: 2022-12-05 | End: 2022-12-05

## 2022-12-05 RX ORDER — DIVALPROEX SODIUM 250 MG/1
250 TABLET, DELAYED RELEASE ORAL EVERY 8 HOURS
Qty: 90 TABLET | Refills: 0 | Status: ON HOLD | OUTPATIENT
Start: 2022-12-05 | End: 2022-12-13 | Stop reason: HOSPADM

## 2022-12-05 RX ORDER — DULAGLUTIDE 0.75 MG/.5ML
0.75 INJECTION, SOLUTION SUBCUTANEOUS
COMMUNITY
Start: 2022-09-26

## 2022-12-05 RX ORDER — CEFDINIR 300 MG/1
300 CAPSULE ORAL EVERY 12 HOURS
Status: DISCONTINUED | OUTPATIENT
Start: 2022-12-05 | End: 2022-12-06 | Stop reason: HOSPADM

## 2022-12-05 RX ORDER — CEFDINIR 300 MG/1
300 CAPSULE ORAL 2 TIMES DAILY
Qty: 14 CAPSULE | Refills: 0 | Status: ON HOLD | OUTPATIENT
Start: 2022-12-05 | End: 2022-12-13 | Stop reason: HOSPADM

## 2022-12-05 RX ADMIN — ZIPRASIDONE MESYLATE 20 MG: 20 INJECTION, POWDER, LYOPHILIZED, FOR SOLUTION INTRAMUSCULAR at 05:12

## 2022-12-05 RX ADMIN — CEFDINIR 300 MG: 300 CAPSULE ORAL at 07:12

## 2022-12-05 NOTE — ED PROVIDER NOTES
"Encounter Date: 2022       History     Chief Complaint   Patient presents with    Psychiatric Evaluation     States she is seeing things that are not normal. States she is not on medication currently but needs to be on medications again. Denies SI, HI.      HPI  Ashley Powers is a 37 y.o. female who presents with intermittent, mild, non-command auditory and visual hallucinations worsened by running out of mood medications, particularly depakote.  She said "I'm not here to go to a facility." Denies SI/HI/AVH. She has no other complaints.          Review of patient's allergies indicates:  No Known Allergies  Past Medical History:   Diagnosis Date    Bipolar disorder     Schizophrenia      Past Surgical History:   Procedure Laterality Date     SECTION      CHOLECYSTECTOMY      TUBAL LIGATION       History reviewed. No pertinent family history.  Social History     Tobacco Use    Smoking status: Some Days     Types: Cigarettes    Smokeless tobacco: Never   Substance Use Topics    Alcohol use: Yes     Comment: occasionally     Drug use: Yes     Types: "Crack" cocaine, Codeine, Heroin, Fentanyl, Marijuana     Comment: heronin, fentanyl, crack cocaine, weed - last used this morning     Review of Systems   Constitutional: Negative.    HENT: Negative.     Eyes: Negative.    Respiratory: Negative.     Cardiovascular: Negative.    Gastrointestinal: Negative.    Endocrine: Negative.    Genitourinary: Negative.    Musculoskeletal: Negative.    Skin: Negative.    Allergic/Immunologic: Negative.    Neurological: Negative.    Hematological: Negative.    Psychiatric/Behavioral:  Positive for agitation and hallucinations. Negative for self-injury and suicidal ideas. The patient is nervous/anxious.    All other systems reviewed and are negative.    Physical Exam     Initial Vitals [22 0034]   BP Pulse Resp Temp SpO2   (!) 172/118 (!) 118 18 97.9 °F (36.6 °C) 98 %      MAP       --         Physical Exam    Nursing note " and vitals reviewed.  Constitutional: She appears well-developed and well-nourished. No distress.   HENT:   Head: Normocephalic and atraumatic.   Eyes: Conjunctivae and EOM are normal. Pupils are equal, round, and reactive to light.   Neck: Neck supple.   Cardiovascular:  Normal rate, regular rhythm and intact distal pulses.           Pulmonary/Chest: No respiratory distress.   Abdominal: She exhibits no distension.   Musculoskeletal:         General: Normal range of motion.      Cervical back: Neck supple.     Neurological: She is alert and oriented to person, place, and time. She has normal strength.   Skin: Skin is warm and dry. Capillary refill takes less than 2 seconds.   Psychiatric:   Flat; dystonic mouth movements; anxious; no SI/HI       ED Course   Procedures  Labs Reviewed - No data to display       Imaging Results    None          Medications - No data to display                    No results found for this or any previous visit (from the past 24 hour(s)).       Patient has no SI or HI and is AAOx3. Recommended patient to stay for psych medication adjustment due to mild to moderate schizophrenia exacerbation, but she opted to leave. She did not meet criteria for involuntary hold at this time.     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

## 2022-12-05 NOTE — ED PROVIDER NOTES
"Encounter Date: 2022       History     Chief Complaint   Patient presents with    Suicidal     The history is provided by the patient and the EMS personnel.   Mental Health Problem  The primary symptoms include suicidal ideas. The current episode started today. This is a recurrent problem.   The onset of the illness is precipitated by stressful event. Sequelae of the illness include an inability to work, an inability to care for self and harmed interpersonal relations.   Review of patient's allergies indicates:  No Known Allergies  Past Medical History:   Diagnosis Date    Bipolar disorder     Schizophrenia      Past Surgical History:   Procedure Laterality Date     SECTION      CHOLECYSTECTOMY      TUBAL LIGATION       No family history on file.  Social History     Tobacco Use    Smoking status: Some Days     Types: Cigarettes    Smokeless tobacco: Never   Substance Use Topics    Alcohol use: Yes     Comment: occasionally     Drug use: Yes     Types: "Crack" cocaine, Codeine, Heroin, Fentanyl, Marijuana     Comment: heronin, fentanyl, crack cocaine, weed - last used this morning     Review of Systems   Unable to perform ROS: Psychiatric disorder   Psychiatric/Behavioral:  Positive for suicidal ideas.      Physical Exam     Initial Vitals [22 1654]   BP Pulse Resp Temp SpO2   (!) 165/98 102 20 98.9 °F (37.2 °C) 96 %      MAP       --         Physical Exam    Nursing note and vitals reviewed.  Constitutional: She appears well-developed and well-nourished. No distress.   HENT:   Head: Normocephalic and atraumatic.   Mouth/Throat: Oropharynx is clear and moist.   Eyes: Conjunctivae and EOM are normal. Pupils are equal, round, and reactive to light.   Neck: Neck supple.   Normal range of motion.  Cardiovascular:  Normal rate, regular rhythm and normal heart sounds.     Exam reveals no gallop and no friction rub.       No murmur heard.  Pulmonary/Chest: Breath sounds normal. No respiratory distress. " She has no wheezes. She has no rhonchi. She has no rales.   Abdominal: Abdomen is soft. Bowel sounds are normal. She exhibits no distension and no mass. There is no abdominal tenderness. There is no rebound and no guarding.   Musculoskeletal:         General: No tenderness or edema. Normal range of motion.      Cervical back: Normal range of motion and neck supple.     Neurological: She is alert.   Skin: Skin is warm and dry. No rash noted.   Psychiatric: Her affect is blunt and labile. Her speech is delayed. She is slowed and withdrawn. She expresses impulsivity and inappropriate judgment. She expresses suicidal ideation. She is noncommunicative.       ED Course   Procedures  Labs Reviewed   CBC W/ AUTO DIFFERENTIAL - Abnormal; Notable for the following components:       Result Value    Hemoglobin 11.9 (*)     MCV 79 (*)     MCH 24.8 (*)     MCHC 31.4 (*)     All other components within normal limits    Narrative:     Release to patient->Immediate   COMPREHENSIVE METABOLIC PANEL - Abnormal; Notable for the following components:    Glucose 191 (*)     All other components within normal limits    Narrative:     Release to patient->Immediate   URINALYSIS, REFLEX TO URINE CULTURE - Abnormal; Notable for the following components:    Appearance, UA Cloudy (*)     Specific Gravity, UA >1.030 (*)     Protein, UA 1+ (*)     Ketones, UA 2+ (*)     Bilirubin (UA) 1+ (*)     Occult Blood UA 3+ (*)     Nitrite, UA Positive (*)     Leukocytes, UA Trace (*)     All other components within normal limits    Narrative:     Specimen Source->Urine   DRUG SCREEN PANEL, URINE EMERGENCY - Abnormal; Notable for the following components:    Cocaine (Metab.) Presumptive Positive (*)     Amphetamine Screen, Ur Presumptive Positive (*)     THC Presumptive Positive (*)     Creatinine, Urine >450.0 (*)     All other components within normal limits    Narrative:     Specimen Source->Urine   SALICYLATE LEVEL - Abnormal; Notable for the following  components:    Salicylate Lvl <5.0 (*)     All other components within normal limits    Narrative:     Release to patient->Immediate   ACETAMINOPHEN LEVEL - Abnormal; Notable for the following components:    Acetaminophen (Tylenol), Serum <3.0 (*)     All other components within normal limits    Narrative:     Release to patient->Immediate   URINALYSIS MICROSCOPIC - Abnormal; Notable for the following components:    RBC, UA >100 (*)     WBC, UA 15 (*)     Bacteria Many (*)     All other components within normal limits    Narrative:     Specimen Source->Urine   CULTURE, URINE   ALCOHOL,MEDICAL (ETHANOL)    Narrative:     Release to patient->Immediate   HIV 1 / 2 ANTIBODY   HEPATITIS C ANTIBODY   HEP C VIRUS HOLD SPECIMEN   TSH   PREGNANCY TEST, URINE RAPID   SARS-COV-2 RDRP GENE     Results for orders placed or performed during the hospital encounter of 12/05/22   CBC Auto Differential   Result Value Ref Range    WBC 7.02 3.90 - 12.70 K/uL    RBC 4.79 4.00 - 5.40 M/uL    Hemoglobin 11.9 (L) 12.0 - 16.0 g/dL    Hematocrit 37.9 37.0 - 48.5 %    MCV 79 (L) 82 - 98 fL    MCH 24.8 (L) 27.0 - 31.0 pg    MCHC 31.4 (L) 32.0 - 36.0 g/dL    RDW 14.1 11.5 - 14.5 %    Platelets 245 150 - 450 K/uL    MPV 10.2 9.2 - 12.9 fL    Immature Granulocytes 0.3 0.0 - 0.5 %    Gran # (ANC) 3.9 1.8 - 7.7 K/uL    Immature Grans (Abs) 0.02 0.00 - 0.04 K/uL    Lymph # 2.6 1.0 - 4.8 K/uL    Mono # 0.5 0.3 - 1.0 K/uL    Eos # 0.1 0.0 - 0.5 K/uL    Baso # 0.01 0.00 - 0.20 K/uL    nRBC 0 0 /100 WBC    Gran % 55.6 38.0 - 73.0 %    Lymph % 36.3 18.0 - 48.0 %    Mono % 6.8 4.0 - 15.0 %    Eosinophil % 0.9 0.0 - 8.0 %    Basophil % 0.1 0.0 - 1.9 %    Differential Method Automated    Comprehensive Metabolic Panel   Result Value Ref Range    Sodium 138 136 - 145 mmol/L    Potassium 3.7 3.5 - 5.1 mmol/L    Chloride 105 95 - 110 mmol/L    CO2 23 23 - 29 mmol/L    Glucose 191 (H) 70 - 110 mg/dL    BUN 11 6 - 20 mg/dL    Creatinine 0.9 0.5 - 1.4 mg/dL     Calcium 9.4 8.7 - 10.5 mg/dL    Total Protein 8.2 6.0 - 8.4 g/dL    Albumin 4.0 3.5 - 5.2 g/dL    Total Bilirubin 0.6 0.1 - 1.0 mg/dL    Alkaline Phosphatase 61 55 - 135 U/L    AST 29 10 - 40 U/L    ALT 33 10 - 44 U/L    Anion Gap 10 8 - 16 mmol/L    eGFR >60.0 >60 mL/min/1.73 m^2   Urinalysis, Reflex to Urine Culture Urine, Clean Catch    Specimen: Urine   Result Value Ref Range    Specimen UA Urine, Clean Catch     Color, UA Charlee Yellow, Straw, Charlee    Appearance, UA Cloudy (A) Clear    pH, UA 6.0 5.0 - 8.0    Specific Gravity, UA >1.030 (A) 1.005 - 1.030    Protein, UA 1+ (A) Negative    Glucose, UA Negative Negative    Ketones, UA 2+ (A) Negative    Bilirubin (UA) 1+ (A) Negative    Occult Blood UA 3+ (A) Negative    Nitrite, UA Positive (A) Negative    Urobilinogen, UA Negative <2.0 EU/dL    Leukocytes, UA Trace (A) Negative   Drug screen panel, in-house   Result Value Ref Range    Benzodiazepines Negative Negative    Methadone metabolites Negative Negative    Cocaine (Metab.) Presumptive Positive (A) Negative    Opiate Scrn, Ur Negative Negative    Barbiturate Screen, Ur Negative Negative    Amphetamine Screen, Ur Presumptive Positive (A) Negative    THC Presumptive Positive (A) Negative    Phencyclidine Negative Negative    Creatinine, Urine >450.0 (H) 15.0 - 325.0 mg/dL    Toxicology Information SEE COMMENT    Ethanol   Result Value Ref Range    Alcohol, Serum <10 <10 mg/dL   Salicylate Level   Result Value Ref Range    Salicylate Lvl <5.0 (L) 15.0 - 30.0 mg/dL   Acetaminophen Level   Result Value Ref Range    Acetaminophen (Tylenol), Serum <3.0 (L) 10.0 - 20.0 ug/mL   Urinalysis Microscopic   Result Value Ref Range    RBC, UA >100 (H) 0 - 4 /hpf    WBC, UA 15 (H) 0 - 5 /hpf    Bacteria Many (A) None-Occ /hpf    Squam Epithel, UA 2 /hpf    Hyaline Casts, UA 0 0-1/lpf /lpf    Microscopic Comment SEE COMMENT        EKG Readings: (Independently Interpreted)   Initial Reading: No STEMI. Rhythm: Sinus  Arrhythmia. Heart Rate: 94. Ectopy: Rare PVCs. ST Segments: Normal ST Segments. T Waves: Normal. Axis: Normal. Other Findings: Prolonged QT Interval. Clinical Impression: Sinus Arrhythmia     Imaging Results    None          Medications   cefdinir capsule 300 mg (has no administration in time range)   ziprasidone injection 20 mg (20 mg Intramuscular Given 12/5/22 1725)                    Medically cleared for psychiatry placement: 12/5/2022  6:27 PM         Clinical Impression:   Final diagnoses:  [N39.0, R31.9] Urinary tract infection with hematuria, site unspecified  [Z00.8] Medical clearance for psychiatric admission  [R45.851] Suicidal ideations (Primary)  [F19.10] Polysubstance abuse      ED Disposition Condition    Transfer to Psych Facility Stable          ED Prescriptions    None       Follow-up Information    None          Binh Gonzalez MD  12/05/22 6829

## 2022-12-06 LAB
HCV AB SERPL QL IA: NEGATIVE
HIV 1+2 AB+HIV1 P24 AG SERPL QL IA: NEGATIVE

## 2022-12-07 PROBLEM — N39.0 UTI (URINARY TRACT INFECTION): Status: ACTIVE | Noted: 2022-12-07

## 2022-12-07 PROBLEM — Z13.9 ENCOUNTER FOR MEDICAL SCREENING EXAMINATION: Status: ACTIVE | Noted: 2022-12-07

## 2022-12-07 LAB — BACTERIA UR CULT: ABNORMAL

## 2023-02-14 ENCOUNTER — HOSPITAL ENCOUNTER (EMERGENCY)
Facility: HOSPITAL | Age: 38
Discharge: HOME | End: 2023-02-14
Attending: EMERGENCY MEDICINE
Payer: MEDICAID

## 2023-02-14 VITALS
DIASTOLIC BLOOD PRESSURE: 81 MMHG | SYSTOLIC BLOOD PRESSURE: 146 MMHG | RESPIRATION RATE: 20 BRPM | BODY MASS INDEX: 33.59 KG/M2 | WEIGHT: 214 LBS | HEART RATE: 78 BPM | TEMPERATURE: 98 F | OXYGEN SATURATION: 100 % | HEIGHT: 67 IN

## 2023-02-14 DIAGNOSIS — F12.90 MARIJUANA USE: ICD-10-CM

## 2023-02-14 DIAGNOSIS — F14.90 COCAINE USE: ICD-10-CM

## 2023-02-14 DIAGNOSIS — T40.411A ACCIDENTAL FENTANYL OVERDOSE, INITIAL ENCOUNTER: Primary | ICD-10-CM

## 2023-02-14 DIAGNOSIS — T50.901A ACCIDENTAL DRUG OVERDOSE, INITIAL ENCOUNTER: ICD-10-CM

## 2023-02-14 DIAGNOSIS — F15.90 AMPHETAMINE USE: ICD-10-CM

## 2023-02-14 LAB
ALBUMIN SERPL BCP-MCNC: 3.8 G/DL (ref 3.5–5.2)
ALLENS TEST: ABNORMAL
ALP SERPL-CCNC: 75 U/L (ref 55–135)
ALT SERPL W/O P-5'-P-CCNC: 13 U/L (ref 10–44)
AMORPH CRY UR QL COMP ASSIST: ABNORMAL
AMPHET+METHAMPHET UR QL: ABNORMAL
ANION GAP SERPL CALC-SCNC: 13 MMOL/L (ref 8–16)
APAP SERPL-MCNC: <3 UG/ML (ref 10–20)
AST SERPL-CCNC: 15 U/L (ref 10–40)
B-OH-BUTYR BLD STRIP-SCNC: 0.2 MMOL/L (ref 0–0.5)
BACTERIA #/AREA URNS AUTO: ABNORMAL /HPF
BARBITURATES UR QL SCN>200 NG/ML: NEGATIVE
BASOPHILS # BLD AUTO: 0.01 K/UL (ref 0–0.2)
BASOPHILS NFR BLD: 0.1 % (ref 0–1.9)
BENZODIAZ UR QL SCN>200 NG/ML: NEGATIVE
BILIRUB SERPL-MCNC: 0.2 MG/DL (ref 0.1–1)
BILIRUB UR QL STRIP: ABNORMAL
BUN SERPL-MCNC: 17 MG/DL (ref 6–20)
BZE UR QL SCN: ABNORMAL
CALCIUM SERPL-MCNC: 9.2 MG/DL (ref 8.7–10.5)
CANNABINOIDS UR QL SCN: ABNORMAL
CHLORIDE SERPL-SCNC: 103 MMOL/L (ref 95–110)
CLARITY UR REFRACT.AUTO: ABNORMAL
CO2 SERPL-SCNC: 22 MMOL/L (ref 23–29)
COLOR UR AUTO: YELLOW
CREAT SERPL-MCNC: 0.9 MG/DL (ref 0.5–1.4)
CREAT UR-MCNC: 244 MG/DL (ref 15–325)
DELSYS: ABNORMAL
DIFFERENTIAL METHOD: ABNORMAL
EOSINOPHIL # BLD AUTO: 0 K/UL (ref 0–0.5)
EOSINOPHIL NFR BLD: 0 % (ref 0–8)
ERYTHROCYTE [DISTWIDTH] IN BLOOD BY AUTOMATED COUNT: 14.6 % (ref 11.5–14.5)
EST. GFR  (NO RACE VARIABLE): >60 ML/MIN/1.73 M^2
ETHANOL SERPL-MCNC: <10 MG/DL
GLUCOSE SERPL-MCNC: 259 MG/DL (ref 70–110)
GLUCOSE UR QL STRIP: NEGATIVE
HCO3 UR-SCNC: 26.5 MMOL/L (ref 24–28)
HCT VFR BLD AUTO: 41.9 % (ref 37–48.5)
HGB BLD-MCNC: 13 G/DL (ref 12–16)
HGB UR QL STRIP: ABNORMAL
HYALINE CASTS UR QL AUTO: 0 /LPF
IMM GRANULOCYTES # BLD AUTO: 0.05 K/UL (ref 0–0.04)
IMM GRANULOCYTES NFR BLD AUTO: 0.4 % (ref 0–0.5)
KETONES UR QL STRIP: NEGATIVE
LEUKOCYTE ESTERASE UR QL STRIP: NEGATIVE
LYMPHOCYTES # BLD AUTO: 0.8 K/UL (ref 1–4.8)
LYMPHOCYTES NFR BLD: 6.7 % (ref 18–48)
MCH RBC QN AUTO: 24.9 PG (ref 27–31)
MCHC RBC AUTO-ENTMCNC: 31 G/DL (ref 32–36)
MCV RBC AUTO: 80 FL (ref 82–98)
METHADONE UR QL SCN>300 NG/ML: NEGATIVE
MICROSCOPIC COMMENT: ABNORMAL
MODE: ABNORMAL
MONOCYTES # BLD AUTO: 0.4 K/UL (ref 0.3–1)
MONOCYTES NFR BLD: 3.5 % (ref 4–15)
NEUTROPHILS # BLD AUTO: 10.1 K/UL (ref 1.8–7.7)
NEUTROPHILS NFR BLD: 89.3 % (ref 38–73)
NITRITE UR QL STRIP: POSITIVE
NRBC BLD-RTO: 0 /100 WBC
OPIATES UR QL SCN: NEGATIVE
PCO2 BLDA: 62.2 MMHG (ref 35–45)
PCP UR QL SCN>25 NG/ML: NEGATIVE
PH SMN: 7.24 [PH] (ref 7.35–7.45)
PH UR STRIP: 6 [PH] (ref 5–8)
PLATELET # BLD AUTO: 279 K/UL (ref 150–450)
PMV BLD AUTO: 9.9 FL (ref 9.2–12.9)
PO2 BLDA: 36 MMHG (ref 40–60)
POC BE: -1 MMOL/L
POC SATURATED O2: 57 % (ref 95–100)
POTASSIUM SERPL-SCNC: 4.5 MMOL/L (ref 3.5–5.1)
PROT SERPL-MCNC: 8.2 G/DL (ref 6–8.4)
PROT UR QL STRIP: ABNORMAL
RBC # BLD AUTO: 5.22 M/UL (ref 4–5.4)
RBC #/AREA URNS AUTO: 2 /HPF (ref 0–4)
SALICYLATES SERPL-MCNC: <5 MG/DL (ref 15–30)
SAMPLE: ABNORMAL
SITE: ABNORMAL
SODIUM SERPL-SCNC: 138 MMOL/L (ref 136–145)
SP GR UR STRIP: >=1.03 (ref 1–1.03)
SQUAMOUS #/AREA URNS AUTO: 5 /HPF
T4 FREE SERPL-MCNC: 1.03 NG/DL (ref 0.71–1.51)
TOXICOLOGY INFORMATION: ABNORMAL
TSH SERPL DL<=0.005 MIU/L-ACNC: 0.14 UIU/ML (ref 0.4–4)
URN SPEC COLLECT METH UR: ABNORMAL
UROBILINOGEN UR STRIP-ACNC: NEGATIVE EU/DL
WBC # BLD AUTO: 11.31 K/UL (ref 3.9–12.7)
WBC #/AREA URNS AUTO: 5 /HPF (ref 0–5)

## 2023-02-14 PROCEDURE — 82077 ASSAY SPEC XCP UR&BREATH IA: CPT | Mod: ER | Performed by: EMERGENCY MEDICINE

## 2023-02-14 PROCEDURE — 99284 EMERGENCY DEPT VISIT MOD MDM: CPT | Mod: 25,ER

## 2023-02-14 PROCEDURE — 80179 DRUG ASSAY SALICYLATE: CPT | Mod: ER | Performed by: EMERGENCY MEDICINE

## 2023-02-14 PROCEDURE — 82803 BLOOD GASES ANY COMBINATION: CPT | Mod: ER

## 2023-02-14 PROCEDURE — 85025 COMPLETE CBC W/AUTO DIFF WBC: CPT | Mod: ER | Performed by: EMERGENCY MEDICINE

## 2023-02-14 PROCEDURE — 80307 DRUG TEST PRSMV CHEM ANLYZR: CPT | Mod: ER | Performed by: EMERGENCY MEDICINE

## 2023-02-14 PROCEDURE — 84443 ASSAY THYROID STIM HORMONE: CPT | Mod: ER | Performed by: EMERGENCY MEDICINE

## 2023-02-14 PROCEDURE — 80053 COMPREHEN METABOLIC PANEL: CPT | Mod: ER | Performed by: EMERGENCY MEDICINE

## 2023-02-14 PROCEDURE — 96361 HYDRATE IV INFUSION ADD-ON: CPT | Mod: ER

## 2023-02-14 PROCEDURE — 81000 URINALYSIS NONAUTO W/SCOPE: CPT | Mod: ER,59 | Performed by: EMERGENCY MEDICINE

## 2023-02-14 PROCEDURE — 96360 HYDRATION IV INFUSION INIT: CPT | Mod: ER

## 2023-02-14 PROCEDURE — 80143 DRUG ASSAY ACETAMINOPHEN: CPT | Mod: ER | Performed by: EMERGENCY MEDICINE

## 2023-02-14 PROCEDURE — 63600175 PHARM REV CODE 636 W HCPCS: Mod: ER | Performed by: EMERGENCY MEDICINE

## 2023-02-14 PROCEDURE — 84439 ASSAY OF FREE THYROXINE: CPT | Mod: ER | Performed by: EMERGENCY MEDICINE

## 2023-02-14 PROCEDURE — 99900035 HC TECH TIME PER 15 MIN (STAT): Mod: ER

## 2023-02-14 PROCEDURE — 82010 KETONE BODYS QUAN: CPT | Mod: ER | Performed by: EMERGENCY MEDICINE

## 2023-02-14 RX ORDER — ONDANSETRON 4 MG/1
4 TABLET, ORALLY DISINTEGRATING ORAL EVERY 6 HOURS PRN
Qty: 30 TABLET | Refills: 0 | Status: SHIPPED | OUTPATIENT
Start: 2023-02-14

## 2023-02-14 RX ORDER — NALOXONE HYDROCHLORIDE 1 MG/ML
INJECTION INTRAMUSCULAR; INTRAVENOUS; SUBCUTANEOUS
Qty: 2 ML | Refills: 11 | Status: SHIPPED | OUTPATIENT
Start: 2023-02-14

## 2023-02-14 RX ORDER — NALOXONE HYDROCHLORIDE 4 MG/.1ML
SPRAY NASAL
Qty: 1 EACH | Refills: 11 | Status: SHIPPED | OUTPATIENT
Start: 2023-02-14

## 2023-02-14 RX ADMIN — SODIUM CHLORIDE, POTASSIUM CHLORIDE, SODIUM LACTATE AND CALCIUM CHLORIDE 1000 ML: 600; 310; 30; 20 INJECTION, SOLUTION INTRAVENOUS at 06:02

## 2023-02-14 NOTE — ED PROVIDER NOTES
"Emergency Medicine Provider Note - 2023        History     Chief Complaint   Patient presents with    Drug Overdose     Drug overdose after snorting fentanyl, family found patient unresponsive and not breathing, pt was given 2 mg of narcan IN, 1 mg of narcan IV            History of Present Illness   HPI    2023, 4:37 PM  The history is provided by the patient and KACY    Ashley Powers is a 37 y.o. female presenting to the ED for overdose.    History obtained per [facility].  They were called for unresponsiveness.  When [facility] arrived, patient was unresponsive on the ground.  Fire was bagging.  She had a strong pulse.  Blood sugar was 350.  She is given 2 mg of IM Narcan as well as 1 mg of IV Narcan with improvement in mental status.  Patient does have history of snorting fentanyl.      History per patient.  She admits that she had taken fentanyl to get high.  She denies any suicidal thoughts or homicidal thoughts.  Denies any other ingestions.  She reports that she is been noncompliant with her blood sugar medications.  Patient denies any chest pain, chest pressure, nausea, vomiting, diarrhea.    Arrival mode:  \[facility] [place] [facility]\""      PCP: [facility]     Allergies:  Review of patient's allergies indicates:  No Known Allergies    Past Medical History:  Past Medical History:   Diagnosis Date    Bipolar disorder     Diabetes mellitus     Mixed hyperlipidemia     Schizophrenia        Past Surgical History:  Past Surgical History:   Procedure Laterality Date     SECTION      CHOLECYSTECTOMY      TUBAL LIGATION           Family History:  No family history on file.    Social History:  Social History     Tobacco Use    Smoking status: Some Days     Types: Cigarettes    Smokeless tobacco: Never   Substance and Sexual Activity    Alcohol use: Yes     Comment: occasionally     Drug use: Yes     Types: "Crack" cocaine, Codeine, Heroin, Fentanyl, Marijuana, Methamphetamines, Cocaine     " Comment: heronin, fentanyl, crack cocaine, weed - last used this morning    Sexual activity: Yes            Review of Systems   Review of Systems   Constitutional:  Negative for fever.        (+) Altered mental status   HENT:  Negative for sore throat.    Respiratory:  Negative for shortness of breath.    Cardiovascular:  Negative for chest pain.   Gastrointestinal:  Negative for nausea.   Genitourinary:  Negative for dysuria.   Musculoskeletal:  Negative for back pain.   Skin:  Negative for rash.   Neurological:  Negative for weakness.   Hematological:  Does not bruise/bleed easily.   Psychiatric/Behavioral:  Negative for self-injury and sleep disturbance.         Physical Exam     Initial Vitals [02/14/23 1641]   BP Pulse Resp Temp SpO2   (!) 138/94 80 16 98.4 °F (36.9 °C) 97 %      MAP       --          Physical Exam    Nursing Notes and Vital Signs Reviewed.  Constitutional: Patient is in no apparent distress. Well-developed and well-nourished.  Head: Atraumatic. Normocephalic.  Eyes: PERRL. EOM intact. Conjunctivae are not pale. No scleral icterus.  ENT: Mucous membranes are moist. Oropharynx is clear and symmetric.    Neck: Supple. Full ROM. No lymphadenopathy.  Cardiovascular: Regular rate. Regular rhythm. No murmurs, rubs, or gallops. Distal pulses are 2+ and symmetric.  Pulmonary/Chest: No respiratory distress. Clear to auscultation bilaterally. No wheezing or rales.  Abdominal: Soft and non-distended.  There is no tenderness.  No rebound, guarding, or rigidity. Good bowel sounds.  Genitourinary: No CVA tenderness  Musculoskeletal: Moves all extremities. No obvious deformities. No edema. No calf tenderness.  Skin: Warm and dry.  Neurological:  Alert, awake, and appropriate.  Normal speech.  No acute focal neurological deficits are appreciated.  Psychiatric: Normal affect. Good eye contact. Appropriate in content.     ED Course     ED Procedures:  Procedures    ED Vital Signs:  Vitals:    02/14/23 1641  "02/14/23 1704 02/14/23 1717 02/14/23 1748   BP: (!) 138/94 130/84 130/80 126/86   Pulse: 80 76 77 81   Resp: 16 16 12 20   Temp: 98.4 °F (36.9 °C)      TempSrc: Oral      SpO2: 97% 96% 96% 98%   Weight: 97.1 kg (214 lb)      Height: 5' 7" (1.702 m)       02/14/23 1848 02/14/23 1917 02/14/23 2018 02/14/23 2217   BP: 126/75 132/70 126/72 (!) 164/79   Pulse: 67 67 70 70   Resp: 18 19 20 20   Temp:       TempSrc:       SpO2: 97% 95% 100% 98%   Weight:       Height:        02/14/23 2248 02/14/23 2302   BP: 134/63 (!) 146/81   Pulse: 62 78   Resp: 18 20   Temp:     TempSrc:     SpO2: 97% 100%   Weight:     Height:         Abnormal Lab Results:  Labs Reviewed   CBC W/ AUTO DIFFERENTIAL - Abnormal; Notable for the following components:       Result Value    MCV 80 (*)     MCH 24.9 (*)     MCHC 31.0 (*)     RDW 14.6 (*)     Gran # (ANC) 10.1 (*)     Immature Grans (Abs) 0.05 (*)     Lymph # 0.8 (*)     Gran % 89.3 (*)     Lymph % 6.7 (*)     Mono % 3.5 (*)     All other components within normal limits   COMPREHENSIVE METABOLIC PANEL - Abnormal; Notable for the following components:    CO2 22 (*)     Glucose 259 (*)     All other components within normal limits   TSH - Abnormal; Notable for the following components:    TSH 0.139 (*)     All other components within normal limits   URINALYSIS, REFLEX TO URINE CULTURE - Abnormal; Notable for the following components:    Appearance, UA Cloudy (*)     Specific Gravity, UA >=1.030 (*)     Protein, UA 1+ (*)     Bilirubin (UA) 1+ (*)     Occult Blood UA Trace (*)     Nitrite, UA Positive (*)     All other components within normal limits    Narrative:     Specimen Source->Urine   DRUG SCREEN PANEL, URINE EMERGENCY - Abnormal; Notable for the following components:    Cocaine (Metab.) Presumptive Positive (*)     Amphetamine Screen, Ur Presumptive Positive (*)     THC Presumptive Positive (*)     All other components within normal limits    Narrative:     Specimen Source->Urine "   ACETAMINOPHEN LEVEL - Abnormal; Notable for the following components:    Acetaminophen (Tylenol), Serum <3.0 (*)     All other components within normal limits   SALICYLATE LEVEL - Abnormal; Notable for the following components:    Salicylate Lvl <5.0 (*)     All other components within normal limits   URINALYSIS MICROSCOPIC - Abnormal; Notable for the following components:    Bacteria Moderate (*)     All other components within normal limits    Narrative:     Specimen Source->Urine   ISTAT PROCEDURE - Abnormal; Notable for the following components:    POC PH 7.237 (*)     POC PCO2 62.2 (*)     POC PO2 36 (*)     POC SATURATED O2 57 (*)     All other components within normal limits   ALCOHOL,MEDICAL (ETHANOL)   BETA - HYDROXYBUTYRATE, SERUM   T4, FREE            Imaging Results:  Imaging Results    None               The Emergency Provider reviewed the vital signs and test results, which are outlined above.     ED Discussion        18:00 Care to Dr. Marco Erazo:  No criteria for pec currently.  Will continue reassess.    6:33 PM Re-evaluation. Dr. Erazo reassessed the pt. The pt is resting comfortably and is in no acute distress.  Agree c Dr. Levine's assessment. Discussed available test results and notified pt of pending labs. Answered any questions at this time.     11:33 PM  Reassessment: Dr. Erazo reassessed the pt.  The pt is resting comfortably and is NAD.  Pt states their sx have improved.  Pt able to tolerate po and can ambulate without assistance. Discussed test results, shared treatment plan, specific conditions for return, and the need for f/u.  Advised to refrain from using illicit drugs and to return to ER if any issues arise.  Answered their questions at this time.  Pt understands and agrees to the plan.  The pt has remained hemodynamically stable through ED course and is stable for discharge.  Pt able to ambulate without assistance out of ER.    Regarding DRUG ADDICTION, the patient was  advised that:  an overdose of controlled substances, illegal drugs, or narcotics, can lead to a coma and death; HIV, AIDS, and hepatitis B and C are serious diseases that can be spread through needles, syringes, and other supplies used to inject narcotics; one may also get an infection if injecting narcotics using dirty needles or supplies; illegal narcotics may be mixed with things like talcum powder, baking soda, or poisons that may get stuck in veins and cause infections or clots that may travel to the heart, lungs, or brain and cause death; snorting or sniffing heroin may cause a hole to develop in the cartilage that separates the two sides of the nose; using pure heroin  increases the risks of an overdose and death; and black tar heroin may contain the bacteria that causes botulism which can be life-threatening.    Patient was advised that the treatment of chronic pain, as opposed to the treatment of acute pain, was beyond my scope of practice and was considered a non-emergent, not life threatening, condition. As per LAC 46:XLVII.4513.D.2.b, the practitioner will not prescribe controlled substances in connection with the treatment of chronic or intractable pain defined by LAC 46:XLV.7187-7290, as pain which persists beyond the usual course of a disease, beyond the expected time for healing from bodily trauma, or pain associated with a long-term incurable or intractable medical illness and/or disease. Furthermore, the patient was advised that pursuant to [place] §971, it is considered unlawful for an individual to knowingly or intentionally acquire or obtain possession of a controlled dangerous substance by misrepresentation, fraud, forgery, deception, or subterfuge; to obtain or attempt to obtain a prescription for a controlled substance and/or legend drug by fraud, theft, misrepresentation, deception or subterfuge; or obtain or seek to obtain any controlled dangerous substance or a prescription for a controlled  "dangerous substance from a healthcare practitioner, while being supplied with any controlled dangerous substance or a prescription for a controlled dangerous substance by another healthcare practitioner, without disclosing the fact of the existing prescription to the practitioner from whom the subsequent prescription for a controlled dangerous substance is sought.     I discussed with patient and/or family/caretaker that evaluation in the ED does not suggest any emergent or life threatening medical conditions requiring immediate intervention beyond what was provided in the ED, and I believe patient is safe for discharge.  Regardless, an unremarkable evaluation in the ED does not preclude the development or presence of a serious of life threatening condition. As such, patient was instructed to return immediately for any worsening or change in current symptoms.      ED Medication(s):  Medications   lactated ringers bolus 1,000 mL (0 mLs Intravenous Stopped 2/14/23 2028)         Medical Decision Making                   MDM  Reviewed: vitals and nursing note  Interpretation: labs             MIPS Measures     Smoker? Yes     Hypertension: Pre-hypertension/Hypertension: The pt has been informed that they may have pre-hypertension or hypertension based on a blood pressure reading in the ED. I recommend that the pt call the PCP listed on their discharge instructions or a physician of their choice this week to arrange f/u for further evaluation of possible pre-hypertension or hypertension.       Portions of this note may have been created with voice recognition software. Occasional "wrong-word" or "sound-a-like" substitutions may have occurred due to the inherent limitations of voice recognition software. Please, read the note carefully and recognize, using context, where substitutions have occurred.            Clinical Impression       ICD-10-CM ICD-9-CM   1. Accidental fentanyl overdose, initial encounter  T40.411A " 965.09     E850.2   2. Accidental drug overdose, initial encounter  T50.901A 977.9     E858.9   3. Cocaine use  F14.90 305.60   4. Amphetamine use  F15.90 305.70   5. Marijuana use  F12.90 305.20         ED Disposition       Disposition: Discharge to home  Patient condition: Good    Medication List     Medication List        START taking these medications      ondansetron 4 MG Tbdl  Commonly known as: ZOFRAN-ODT  Take 1 tablet (4 mg total) by mouth every 6 (six) hours as needed.            CHANGE how you take these medications      * naloxone 1 mg/mL injection  Commonly known as: NARCAN  2 mg (1 mg per nostril) by Nasal route as needed for opioid overdose; may repeat in 3 to 5 minutes if not effective. Call 911  What changed: Another medication with the same name was added. Make sure you understand how and when to take each.     * naloxone 1 mg/mL injection  Commonly known as: NARCAN  2 mg (1 mg per nostril) by Nasal route as needed for opioid overdose; may repeat in 3 to 5 minutes if not effective. Call 911  What changed: You were already taking a medication with the same name, and this prescription was added. Make sure you understand how and when to take each.     * naloxone 4 mg/actuation Spry  Commonly known as: NARCAN  4mg by nasal route as needed for opioid overdose; may repeat every 2-3 minutes in alternating nostrils until medical help arrives. Call 911  What changed: You were already taking a medication with the same name, and this prescription was added. Make sure you understand how and when to take each.           * This list has 3 medication(s) that are the same as other medications prescribed for you. Read the directions carefully, and ask your doctor or other care provider to review them with you.                ASK your doctor about these medications      atorvastatin 20 MG tablet  Commonly known as: LIPITOR  Take 1 tablet (20 mg total) by mouth every evening.     divalproex 500 MG Tbec  Commonly  known as: DEPAKOTE  Take 1 tablet (500 mg total) by mouth every 8 (eight) hours.     metFORMIN 500 MG tablet  Commonly known as: GLUCOPHAGE  Take 1 tablet (500 mg total) by mouth 2 (two) times daily with meals.     paliperidone palmitate 156 mg/mL Syrg injection  Commonly known as: INVEGA SUSTENNA  Inject 1 mL (156 mg total) into the muscle every 28 days.     traZODone 100 MG tablet  Commonly known as: DESYREL  Take 1 tablet (100 mg total) by mouth every evening.     TRULICITY 0.75 mg/0.5 mL pen injector  Generic drug: dulaglutide               Where to Get Your Medications        You can get these medications from any pharmacy    Bring a paper prescription for each of these medications  naloxone 1 mg/mL injection  naloxone 4 mg/actuation Spry  ondansetron 4 MG Tbdl         ED Follow-up   Follow-up Information       [facility]. Schedule an appointment as soon as possible for a visit in 1 week.    Contact information:  [address]  [place] [id number] [phone]               [facility] - Internal Medicine. Schedule an appointment as soon as possible for a visit in 1 week.    Specialty: Internal Medicine  Contact information:  [address]  [facility] [id number] [phone]  Additional information:  Please park in surface lot and check in at main registration.             [facility] Emergency Dept.    Specialty: Emergency Medicine  Why: As needed, If symptoms worsen  Contact information:  [address]  [facility] [id number] [phone]                                    Marco Erazo Jr., MD  02/14/23 7749

## 2023-02-15 NOTE — DISCHARGE INSTRUCTIONS
Advised to refrain from using any illicit drugs.    Regarding DRUG ADDICTION, the patient was advised that:  an overdose of controlled substances, illegal drugs, or narcotics, can lead to a coma and death; HIV, AIDS, and hepatitis B and C are serious diseases that can be spread through needles, syringes, and other supplies used to inject narcotics; one may also get an infection if injecting narcotics using dirty needles or supplies; illegal narcotics may be mixed with things like talcum powder, baking soda, or poisons that may get stuck in veins and cause infections or clots that may travel to the heart, lungs, or brain and cause death; snorting or sniffing heroin may cause a hole to develop in the cartilage that separates the two sides of the nose; using pure heroin  increases the risks of an overdose and death; and black tar heroin may contain the bacteria that causes botulism which can be life-threatening.    Patient was advised that the treatment of chronic pain, as opposed to the treatment of acute pain, was beyond my scope of practice and was considered a non-emergent, not life threatening, condition. As per LAC 46:XLVII.4513.D.2.b, the practitioner will not prescribe controlled substances in connection with the treatment of chronic or intractable pain defined by LAC 46:XLV.4311-7447, as pain which persists beyond the usual course of a disease, beyond the expected time for healing from bodily trauma, or pain associated with a long-term incurable or intractable medical illness and/or disease. Furthermore, the patient was advised that pursuant to [place] §971, it is considered unlawful for an individual to knowingly or intentionally acquire or obtain possession of a controlled dangerous substance by misrepresentation, fraud, forgery, deception, or subterfuge; to obtain or attempt to obtain a prescription for a controlled substance and/or legend drug by fraud, theft, misrepresentation, deception or subterfuge; or  obtain or seek to obtain any controlled dangerous substance or a prescription for a controlled dangerous substance from a healthcare practitioner, while being supplied with any controlled dangerous substance or a prescription for a controlled dangerous substance by another healthcare practitioner, without disclosing the fact of the existing prescription to the practitioner from whom the subsequent prescription for a controlled dangerous substance is sought.     Regarding VOMITING, I advised patient on importance of staying well hydrated; eating frequent, small amounts of clear liquids; avoid solid foods until there has been no vomiting for six hours, and then work slowly back to a normal diet; and to use an OTC bismuth stomach remedy for upset stomach, nausea, indigestion, and diarrhea. I discussed signs and symptoms of dehydration and possible causes of nausea and vomiting including viral infections, medications, migraine headaches, food poisoning, allergies, and peptic ulcer disease. Instructed patient to take medications as prescribed and to follow up with primary care provider or return to ER if condition worsens.

## 2023-03-13 PROBLEM — N39.0 UTI (URINARY TRACT INFECTION): Status: RESOLVED | Noted: 2022-12-07 | Resolved: 2023-03-13

## 2023-03-13 PROBLEM — Z13.9 ENCOUNTER FOR MEDICAL SCREENING EXAMINATION: Status: RESOLVED | Noted: 2022-12-07 | Resolved: 2023-03-13
